# Patient Record
Sex: FEMALE | Race: WHITE | URBAN - METROPOLITAN AREA
[De-identification: names, ages, dates, MRNs, and addresses within clinical notes are randomized per-mention and may not be internally consistent; named-entity substitution may affect disease eponyms.]

---

## 2018-02-01 ENCOUNTER — EMERGENCY (EMERGENCY)
Facility: HOSPITAL | Age: 43
LOS: 0 days | Discharge: HOME | End: 2018-02-01

## 2018-02-01 DIAGNOSIS — L02.411 CUTANEOUS ABSCESS OF RIGHT AXILLA: ICD-10-CM

## 2018-02-01 DIAGNOSIS — D25.9 LEIOMYOMA OF UTERUS, UNSPECIFIED: ICD-10-CM

## 2020-06-11 ENCOUNTER — EMERGENCY (EMERGENCY)
Facility: HOSPITAL | Age: 45
LOS: 0 days | Discharge: HOME | End: 2020-06-11
Attending: EMERGENCY MEDICINE | Admitting: EMERGENCY MEDICINE
Payer: COMMERCIAL

## 2020-06-11 VITALS
RESPIRATION RATE: 18 BRPM | OXYGEN SATURATION: 100 % | SYSTOLIC BLOOD PRESSURE: 148 MMHG | DIASTOLIC BLOOD PRESSURE: 90 MMHG | WEIGHT: 156.97 LBS | TEMPERATURE: 98 F | HEART RATE: 73 BPM

## 2020-06-11 DIAGNOSIS — L02.213 CUTANEOUS ABSCESS OF CHEST WALL: ICD-10-CM

## 2020-06-11 DIAGNOSIS — L03.313 CELLULITIS OF CHEST WALL: ICD-10-CM

## 2020-06-11 DIAGNOSIS — L02.91 CUTANEOUS ABSCESS, UNSPECIFIED: ICD-10-CM

## 2020-06-11 PROCEDURE — 99283 EMERGENCY DEPT VISIT LOW MDM: CPT

## 2020-06-11 NOTE — ED ADULT NURSE NOTE - NSIMPLEMENTINTERV_GEN_ALL_ED
Implemented All Universal Safety Interventions:  [place] to call system. Call bell, personal items and telephone within reach. Instruct patient to call for assistance. Room bathroom lighting operational. Non-slip footwear when patient is off stretcher. Physically safe environment: no spills, clutter or unnecessary equipment. Stretcher in lowest position, wheels locked, appropriate side rails in place.

## 2020-06-11 NOTE — ED PROVIDER NOTE - CLINICAL SUMMARY MEDICAL DECISION MAKING FREE TEXT BOX
pt with cutaneous abscess to left chest wall. mild surrounding cellulitis. I and D, abx, dc home wc in 48 hours.

## 2020-06-11 NOTE — ED PROVIDER NOTE - PATIENT PORTAL LINK FT
You can access the FollowMyHealth Patient Portal offered by [facility] by registering at the following website: [email or web address]. By joining [facility]’s FollowMyHealth portal, you will also be able to view your health information using other applications (apps) compatible with our system.

## 2020-06-11 NOTE — ED PROVIDER NOTE - NS ED ROS FT
Constitutional: See HPI.  Eyes: No visual changes, eye pain or discharge. No Photophobia  ENMT: No hearing changes, pain, discharge or infections. No neck pain or stiffness. No limited ROM  GI: No nausea, vomiting, diarrhea or abdominal pain.  MS: No myalgia, muscle weakness, joint pain or back pain.  Skin: see hpi   Except as documented in the HPI, all other systems are negative.

## 2020-06-11 NOTE — ED PROVIDER NOTE - OBJECTIVE STATEMENT
44 y/o F hx of skin abscesses p/w left upper quadrant abdominal wall abscess over past 4 days, tender to touch, no fevers/chills, no nausea/vomiting/diarrhea. no other complaints, unknown hx of MRSA.

## 2020-06-11 NOTE — ED ADULT NURSE NOTE - BOWEL SOUNDS LLQ
Start 100 mcg synthroid- samples sent / provided  F/u 6-8 weeks with follow up tfts and thyroid antibodies  Reviewed outside lab work - normal T4 with high TSH consistent with subclinical hypothyroidism   Management discussed along with goals for treatment in early pregnancy    present

## 2020-06-11 NOTE — ED PROVIDER NOTE - PHYSICAL EXAMINATION
VITAL SIGNS: I have reviewed nursing notes and confirm.  CONSTITUTIONAL: well-appearing, non-toxic, NAD  SKIN: Warm dry, normal skin turgor, left upper abdominal wall fluctuance, tenderness, with surrounding erythema  HEAD: NCAT  NECK: Supple; non tender. Full ROM. No cervical LAD  CARD: RRR, no murmurs, rubs or gallops  RESP: clear to ausculation b/l.  No rales, rhonchi, or wheezing.  ABD: soft, + BS, non-tender, non-distended, no rebound or guarding. No CVA tenderness  PSYCH: Cooperative, appropriate.

## 2020-06-11 NOTE — ED PROVIDER NOTE - NSFOLLOWUPINSTRUCTIONS_ED_ALL_ED_FT
Abscess    An abscess is an infected area that contains a collection of pus and debris. It can occur in almost any part of the body and occurs when the tissue gets infection. Symptoms include a painful mass that is red, warm, tender that might break open and HAVE drainage. If your health care provider gave you antibiotics make sure to take the full course and do not stop even if feeling better.   Please provide warm compresses to the affected area multiple times a day. you can do that by soaking a towel under warm water or using heat pack.   SEEK IMMEDIATE MEDICAL CARE IF YOU HAVE ANY OF THE FOLLOWING SYMPTOMS: chills, fever, muscle aches, or red streaking from the area or if symptoms arent improving or getting worse

## 2022-02-24 ENCOUNTER — APPOINTMENT (OUTPATIENT)
Dept: PLASTIC SURGERY | Facility: CLINIC | Age: 47
End: 2022-02-24

## 2023-04-03 NOTE — ED ADULT NURSE NOTE - NS ED NURSE LEVEL OF CONSCIOUSNESS AFFECT
Pt c/o abdominal cramping and diarrhea since colonoscopy on 4/23. Pt denies other complaints.  
Calm

## 2023-10-04 ENCOUNTER — INPATIENT (INPATIENT)
Facility: HOSPITAL | Age: 48
LOS: 5 days | Discharge: HOME | DRG: 809 | End: 2023-10-10
Attending: STUDENT IN AN ORGANIZED HEALTH CARE EDUCATION/TRAINING PROGRAM | Admitting: STUDENT IN AN ORGANIZED HEALTH CARE EDUCATION/TRAINING PROGRAM
Payer: COMMERCIAL

## 2023-10-04 ENCOUNTER — TRANSCRIPTION ENCOUNTER (OUTPATIENT)
Age: 48
End: 2023-10-04

## 2023-10-04 VITALS
SYSTOLIC BLOOD PRESSURE: 104 MMHG | WEIGHT: 165.35 LBS | TEMPERATURE: 103 F | RESPIRATION RATE: 18 BRPM | DIASTOLIC BLOOD PRESSURE: 62 MMHG | OXYGEN SATURATION: 98 % | HEART RATE: 108 BPM

## 2023-10-04 DIAGNOSIS — Z90.710 ACQUIRED ABSENCE OF BOTH CERVIX AND UTERUS: Chronic | ICD-10-CM

## 2023-10-04 DIAGNOSIS — D70.9 NEUTROPENIA, UNSPECIFIED: ICD-10-CM

## 2023-10-04 LAB
ALBUMIN SERPL ELPH-MCNC: 4.3 G/DL — SIGNIFICANT CHANGE UP (ref 3.5–5.2)
ALP SERPL-CCNC: 72 U/L — SIGNIFICANT CHANGE UP (ref 30–115)
ALT FLD-CCNC: 23 U/L — SIGNIFICANT CHANGE UP (ref 0–41)
ANION GAP SERPL CALC-SCNC: 10 MMOL/L — SIGNIFICANT CHANGE UP (ref 7–14)
ANISOCYTOSIS BLD QL: SLIGHT — SIGNIFICANT CHANGE UP
APPEARANCE UR: CLEAR — SIGNIFICANT CHANGE UP
APTT BLD: 31.3 SEC — SIGNIFICANT CHANGE UP (ref 27–39.2)
AST SERPL-CCNC: 19 U/L — SIGNIFICANT CHANGE UP (ref 0–41)
BACTERIA # UR AUTO: ABNORMAL /HPF
BASE EXCESS BLDV CALC-SCNC: 0.7 MMOL/L — SIGNIFICANT CHANGE UP (ref -2–3)
BASOPHILS # BLD AUTO: 0.05 K/UL — SIGNIFICANT CHANGE UP (ref 0–0.2)
BASOPHILS NFR BLD AUTO: 4.8 % — HIGH (ref 0–1)
BILIRUB SERPL-MCNC: 0.4 MG/DL — SIGNIFICANT CHANGE UP (ref 0.2–1.2)
BILIRUB UR-MCNC: NEGATIVE — SIGNIFICANT CHANGE UP
BUN SERPL-MCNC: 7 MG/DL — LOW (ref 10–20)
CA-I SERPL-SCNC: 1.23 MMOL/L — SIGNIFICANT CHANGE UP (ref 1.15–1.33)
CALCIUM SERPL-MCNC: 9.7 MG/DL — SIGNIFICANT CHANGE UP (ref 8.4–10.5)
CHLORIDE SERPL-SCNC: 97 MMOL/L — LOW (ref 98–110)
CO2 SERPL-SCNC: 25 MMOL/L — SIGNIFICANT CHANGE UP (ref 17–32)
COLOR SPEC: YELLOW — SIGNIFICANT CHANGE UP
CREAT SERPL-MCNC: 0.8 MG/DL — SIGNIFICANT CHANGE UP (ref 0.7–1.5)
DACRYOCYTES BLD QL SMEAR: SLIGHT — SIGNIFICANT CHANGE UP
DIFF PNL FLD: ABNORMAL
EGFR: 91 ML/MIN/1.73M2 — SIGNIFICANT CHANGE UP
EOSINOPHIL # BLD AUTO: 0 K/UL — SIGNIFICANT CHANGE UP (ref 0–0.7)
EOSINOPHIL NFR BLD AUTO: 0 % — SIGNIFICANT CHANGE UP (ref 0–8)
EPI CELLS # UR: PRESENT
FLUAV AG NPH QL: SIGNIFICANT CHANGE UP
FLUBV AG NPH QL: SIGNIFICANT CHANGE UP
GAS PNL BLDV: 131 MMOL/L — LOW (ref 136–145)
GAS PNL BLDV: SIGNIFICANT CHANGE UP
GAS PNL BLDV: SIGNIFICANT CHANGE UP
GIANT PLATELETS BLD QL SMEAR: PRESENT — SIGNIFICANT CHANGE UP
GLUCOSE SERPL-MCNC: 135 MG/DL — HIGH (ref 70–99)
GLUCOSE UR QL: NEGATIVE MG/DL — SIGNIFICANT CHANGE UP
HCO3 BLDV-SCNC: 26 MMOL/L — SIGNIFICANT CHANGE UP (ref 22–29)
HCT VFR BLD CALC: 30.2 % — LOW (ref 37–47)
HCT VFR BLDA CALC: 53 % — HIGH (ref 39–51)
HGB BLD CALC-MCNC: 17.6 G/DL — HIGH (ref 12.6–17.4)
HGB BLD-MCNC: 10.3 G/DL — LOW (ref 12–16)
INR BLD: 1.1 RATIO — SIGNIFICANT CHANGE UP (ref 0.65–1.3)
KETONES UR-MCNC: NEGATIVE MG/DL — SIGNIFICANT CHANGE UP
LACTATE BLDV-MCNC: 1.8 MMOL/L — SIGNIFICANT CHANGE UP (ref 0.5–2)
LEUKOCYTE ESTERASE UR-ACNC: NEGATIVE — SIGNIFICANT CHANGE UP
LYMPHOCYTES # BLD AUTO: 0.93 K/UL — LOW (ref 1.2–3.4)
LYMPHOCYTES # BLD AUTO: 84.6 % — HIGH (ref 20.5–51.1)
MACROCYTES BLD QL: SLIGHT — SIGNIFICANT CHANGE UP
MANUAL SMEAR VERIFICATION: SIGNIFICANT CHANGE UP
MCHC RBC-ENTMCNC: 34.1 G/DL — SIGNIFICANT CHANGE UP (ref 32–37)
MCHC RBC-ENTMCNC: 35.2 PG — HIGH (ref 27–31)
MCV RBC AUTO: 103.1 FL — HIGH (ref 81–99)
MONOCYTES # BLD AUTO: 0.04 K/UL — LOW (ref 0.1–0.6)
MONOCYTES NFR BLD AUTO: 3.9 % — SIGNIFICANT CHANGE UP (ref 1.7–9.3)
NEUTROPHILS # BLD AUTO: 0.02 K/UL — LOW (ref 1.4–6.5)
NEUTROPHILS NFR BLD AUTO: 1.9 % — LOW (ref 42.2–75.2)
NITRITE UR-MCNC: NEGATIVE — SIGNIFICANT CHANGE UP
NRBC # BLD: 1 /100 — HIGH (ref 0–0)
NRBC # BLD: SIGNIFICANT CHANGE UP /100 WBCS (ref 0–0)
PCO2 BLDV: 43 MMHG — HIGH (ref 39–42)
PH BLDV: 7.39 — SIGNIFICANT CHANGE UP (ref 7.32–7.43)
PH UR: 7.5 — SIGNIFICANT CHANGE UP (ref 5–8)
PLAT MORPH BLD: ABNORMAL
PLATELET # BLD AUTO: 37 K/UL — LOW (ref 130–400)
PMV BLD: 12.6 FL — HIGH (ref 7.4–10.4)
PO2 BLDV: 43 MMHG — SIGNIFICANT CHANGE UP
POIKILOCYTOSIS BLD QL AUTO: SLIGHT — SIGNIFICANT CHANGE UP
POTASSIUM BLDV-SCNC: 3.9 MMOL/L — SIGNIFICANT CHANGE UP (ref 3.5–5.1)
POTASSIUM SERPL-MCNC: 4.4 MMOL/L — SIGNIFICANT CHANGE UP (ref 3.5–5)
POTASSIUM SERPL-SCNC: 4.4 MMOL/L — SIGNIFICANT CHANGE UP (ref 3.5–5)
PROT SERPL-MCNC: 6.5 G/DL — SIGNIFICANT CHANGE UP (ref 6–8)
PROT UR-MCNC: 30 MG/DL
PROTHROM AB SERPL-ACNC: 12.6 SEC — SIGNIFICANT CHANGE UP (ref 9.95–12.87)
RBC # BLD: 2.93 M/UL — LOW (ref 4.2–5.4)
RBC # FLD: 12.6 % — SIGNIFICANT CHANGE UP (ref 11.5–14.5)
RBC BLD AUTO: ABNORMAL
RBC CASTS # UR COMP ASSIST: SIGNIFICANT CHANGE UP /HPF (ref 0–4)
RSV RNA NPH QL NAA+NON-PROBE: SIGNIFICANT CHANGE UP
SAO2 % BLDV: 68.2 % — SIGNIFICANT CHANGE UP
SARS-COV-2 RNA SPEC QL NAA+PROBE: SIGNIFICANT CHANGE UP
SODIUM SERPL-SCNC: 132 MMOL/L — LOW (ref 135–146)
SP GR SPEC: 1.01 — SIGNIFICANT CHANGE UP (ref 1–1.03)
STOMATOCYTES BLD QL SMEAR: SLIGHT — SIGNIFICANT CHANGE UP
UROBILINOGEN FLD QL: 1 MG/DL — SIGNIFICANT CHANGE UP (ref 0.2–1)
VARIANT LYMPHS # BLD: 4.8 % — SIGNIFICANT CHANGE UP (ref 0–5)
WBC # BLD: 1.1 K/UL — LOW (ref 4.8–10.8)
WBC # FLD AUTO: 1.1 K/UL — LOW (ref 4.8–10.8)
WBC UR QL: 0 /HPF — SIGNIFICANT CHANGE UP (ref 0–5)

## 2023-10-04 PROCEDURE — 99291 CRITICAL CARE FIRST HOUR: CPT

## 2023-10-04 PROCEDURE — 86900 BLOOD TYPING SEROLOGIC ABO: CPT

## 2023-10-04 PROCEDURE — 80053 COMPREHEN METABOLIC PANEL: CPT

## 2023-10-04 PROCEDURE — 82746 ASSAY OF FOLIC ACID SERUM: CPT

## 2023-10-04 PROCEDURE — 85379 FIBRIN DEGRADATION QUANT: CPT

## 2023-10-04 PROCEDURE — 85610 PROTHROMBIN TIME: CPT

## 2023-10-04 PROCEDURE — 86901 BLOOD TYPING SEROLOGIC RH(D): CPT

## 2023-10-04 PROCEDURE — 83605 ASSAY OF LACTIC ACID: CPT

## 2023-10-04 PROCEDURE — 87040 BLOOD CULTURE FOR BACTERIA: CPT

## 2023-10-04 PROCEDURE — 36415 COLL VENOUS BLD VENIPUNCTURE: CPT

## 2023-10-04 PROCEDURE — 74176 CT ABD & PELVIS W/O CONTRAST: CPT

## 2023-10-04 PROCEDURE — 85027 COMPLETE CBC AUTOMATED: CPT

## 2023-10-04 PROCEDURE — 0225U NFCT DS DNA&RNA 21 SARSCOV2: CPT

## 2023-10-04 PROCEDURE — 93010 ELECTROCARDIOGRAM REPORT: CPT

## 2023-10-04 PROCEDURE — 83735 ASSAY OF MAGNESIUM: CPT

## 2023-10-04 PROCEDURE — 85730 THROMBOPLASTIN TIME PARTIAL: CPT

## 2023-10-04 PROCEDURE — 86850 RBC ANTIBODY SCREEN: CPT

## 2023-10-04 PROCEDURE — 87449 NOS EACH ORGANISM AG IA: CPT

## 2023-10-04 PROCEDURE — 93005 ELECTROCARDIOGRAM TRACING: CPT

## 2023-10-04 PROCEDURE — 71045 X-RAY EXAM CHEST 1 VIEW: CPT | Mod: 26

## 2023-10-04 PROCEDURE — 82607 VITAMIN B-12: CPT

## 2023-10-04 PROCEDURE — 84145 PROCALCITONIN (PCT): CPT

## 2023-10-04 PROCEDURE — 80202 ASSAY OF VANCOMYCIN: CPT

## 2023-10-04 PROCEDURE — 85025 COMPLETE CBC W/AUTO DIFF WBC: CPT

## 2023-10-04 RX ORDER — CEFEPIME 1 G/1
2000 INJECTION, POWDER, FOR SOLUTION INTRAMUSCULAR; INTRAVENOUS ONCE
Refills: 0 | Status: COMPLETED | OUTPATIENT
Start: 2023-10-04 | End: 2023-10-04

## 2023-10-04 RX ORDER — ACETAMINOPHEN 500 MG
650 TABLET ORAL ONCE
Refills: 0 | Status: COMPLETED | OUTPATIENT
Start: 2023-10-04 | End: 2023-10-04

## 2023-10-04 RX ORDER — SODIUM CHLORIDE 9 MG/ML
2300 INJECTION, SOLUTION INTRAVENOUS ONCE
Refills: 0 | Status: COMPLETED | OUTPATIENT
Start: 2023-10-04 | End: 2023-10-04

## 2023-10-04 RX ORDER — CEFEPIME 1 G/1
2000 INJECTION, POWDER, FOR SOLUTION INTRAMUSCULAR; INTRAVENOUS EVERY 8 HOURS
Refills: 0 | Status: DISCONTINUED | OUTPATIENT
Start: 2023-10-05 | End: 2023-10-05

## 2023-10-04 RX ORDER — CEFEPIME 1 G/1
INJECTION, POWDER, FOR SOLUTION INTRAMUSCULAR; INTRAVENOUS
Refills: 0 | Status: DISCONTINUED | OUTPATIENT
Start: 2023-10-04 | End: 2023-10-05

## 2023-10-04 RX ORDER — ONDANSETRON 8 MG/1
4 TABLET, FILM COATED ORAL ONCE
Refills: 0 | Status: COMPLETED | OUTPATIENT
Start: 2023-10-04 | End: 2023-10-04

## 2023-10-04 RX ORDER — VANCOMYCIN HCL 1 G
1000 VIAL (EA) INTRAVENOUS ONCE
Refills: 0 | Status: COMPLETED | OUTPATIENT
Start: 2023-10-04 | End: 2023-10-04

## 2023-10-04 RX ADMIN — CEFEPIME 100 MILLIGRAM(S): 1 INJECTION, POWDER, FOR SOLUTION INTRAMUSCULAR; INTRAVENOUS at 21:16

## 2023-10-04 RX ADMIN — SODIUM CHLORIDE 1000 MILLILITER(S): 9 INJECTION, SOLUTION INTRAVENOUS at 22:30

## 2023-10-04 RX ADMIN — Medication 250 MILLIGRAM(S): at 21:34

## 2023-10-04 RX ADMIN — Medication 650 MILLIGRAM(S): at 19:30

## 2023-10-04 RX ADMIN — SODIUM CHLORIDE 2300 MILLILITER(S): 9 INJECTION, SOLUTION INTRAVENOUS at 19:37

## 2023-10-04 RX ADMIN — ONDANSETRON 4 MILLIGRAM(S): 8 TABLET, FILM COATED ORAL at 19:31

## 2023-10-04 NOTE — ED ADULT NURSE REASSESSMENT NOTE - NS ED NURSE REASSESS COMMENT FT1
received pt from previous shift, pt resting in bed, Tylenol and fluids given as ordered. all needs attended at this time.

## 2023-10-04 NOTE — ED PROVIDER NOTE - WR ORDER DATE AND TIME 1
04-Oct-2023 19:14
FAMILY HISTORY:  Father  Still living? Unknown  Family history of colon cancer in father, Age at diagnosis: Age Unknown

## 2023-10-04 NOTE — ED ADULT TRIAGE NOTE - CHIEF COMPLAINT QUOTE
Patient in NAD a+ox3 hx of breast CA on chemo co fever today, tmax 103F denies taking antipyretics. Pt co nausea and body aches

## 2023-10-04 NOTE — ED ADULT NURSE REASSESSMENT NOTE - NS ED NURSE REASSESS COMMENT FT1
around 2230 PT  manual bp 80/50  pt a&Ox4 not in any distress, pt reports feeling better than when she came in, denies dizziness or lightheadedness at this time. MD Rodriges notified 1 L LR bolus ordered. and given w/ + effect noted. b/p 104/63 hr 96 oral temp 99.3 all needs attended at this time, pt attempting to get some rest.

## 2023-10-04 NOTE — ED PROVIDER NOTE - ATTENDING CONTRIBUTION TO CARE
Attending Statement: I have personally provided the amount of critical care time documented below excluding time spent on separate procedures.     Critical Care Time Spent (min) Must be 30 or more minutes to qualify: 35.

## 2023-10-04 NOTE — ED PROVIDER NOTE - PHYSICAL EXAMINATION
CONSTITUTIONAL: Well-developed; well-nourished; in no acute distress.   SKIN: Warm, diaphoretic.  HEAD: Normocephalic; atraumatic  EYES: EOMI, normal sclera and conjunctiva   ENT: No nasal discharge; airway clear. MMM  NECK: Supple; non tender.  CARD:  Regular rate and rhythm. Normal S1, S2. 2+ radial pulses. Normal capillary refill.  RESP: No increased WOB. CTA b/l without wheezes, crackles, rhonchi  ABD: Normoactive BS. Soft, nontender, nondistended.  EXT: Normal ROM. No LE edema.  NEURO: Alert, oriented, grossly unremarkable  PSYCH: Cooperative, appropriate.

## 2023-10-04 NOTE — ED PROVIDER NOTE - EKG ADDITIONAL INFORMATION FREE TEXT
Sinus tachycardia, normal axis, nonspecific T wave abnormalities, no ST depression or elevation, QTc 539

## 2023-10-04 NOTE — ED PROVIDER NOTE - CLINICAL SUMMARY MEDICAL DECISION MAKING FREE TEXT BOX
48-year-old female with history of breast cancer, on chemotherapy, last session 1 week ago presents with 1 day of fever and 1 week of generalized fatigue.  No other symptoms.  On exam fatigued appearing, nontoxic, lungs clear bilaterally, heart regular no murmurs, no rash, right chest port with no erythema or swelling, abdomen benign, no CVA tenderness.  No meningismus.  Patient noted to be hypotensive, given 31 cc/kg, found to be neutropenic, given broad-spectrum IV antibiotics, lactate normal, blood cultures sent prior to antibiotics, chest x-ray clear my interpretation urinalysis not consistent with infection.  Patient with improved blood pressure status post fluids, admitted to stepdown unit.

## 2023-10-05 PROBLEM — Z00.00 ENCOUNTER FOR PREVENTIVE HEALTH EXAMINATION: Status: ACTIVE | Noted: 2023-10-05

## 2023-10-05 LAB
ALBUMIN SERPL ELPH-MCNC: 3.4 G/DL — LOW (ref 3.5–5.2)
ALP SERPL-CCNC: 58 U/L — SIGNIFICANT CHANGE UP (ref 30–115)
ALT FLD-CCNC: 19 U/L — SIGNIFICANT CHANGE UP (ref 0–41)
ANION GAP SERPL CALC-SCNC: 9 MMOL/L — SIGNIFICANT CHANGE UP (ref 7–14)
AST SERPL-CCNC: 19 U/L — SIGNIFICANT CHANGE UP (ref 0–41)
BASOPHILS # BLD AUTO: 0.02 K/UL — SIGNIFICANT CHANGE UP (ref 0–0.2)
BASOPHILS NFR BLD AUTO: 3.2 % — HIGH (ref 0–1)
BILIRUB SERPL-MCNC: 0.4 MG/DL — SIGNIFICANT CHANGE UP (ref 0.2–1.2)
BUN SERPL-MCNC: 6 MG/DL — LOW (ref 10–20)
CALCIUM SERPL-MCNC: 8.4 MG/DL — SIGNIFICANT CHANGE UP (ref 8.4–10.5)
CHLORIDE SERPL-SCNC: 104 MMOL/L — SIGNIFICANT CHANGE UP (ref 98–110)
CO2 SERPL-SCNC: 25 MMOL/L — SIGNIFICANT CHANGE UP (ref 17–32)
CREAT SERPL-MCNC: 0.6 MG/DL — LOW (ref 0.7–1.5)
EGFR: 111 ML/MIN/1.73M2 — SIGNIFICANT CHANGE UP
EOSINOPHIL # BLD AUTO: 0 K/UL — SIGNIFICANT CHANGE UP (ref 0–0.7)
EOSINOPHIL NFR BLD AUTO: 0 % — SIGNIFICANT CHANGE UP (ref 0–8)
FOLATE SERPL-MCNC: >20 NG/ML — SIGNIFICANT CHANGE UP
GLUCOSE SERPL-MCNC: 114 MG/DL — HIGH (ref 70–99)
HCT VFR BLD CALC: 24.8 % — LOW (ref 37–47)
HGB BLD-MCNC: 8.6 G/DL — LOW (ref 12–16)
IMM GRANULOCYTES NFR BLD AUTO: 3.2 % — HIGH (ref 0.1–0.3)
LACTATE SERPL-SCNC: 1.1 MMOL/L — SIGNIFICANT CHANGE UP (ref 0.7–2)
LYMPHOCYTES # BLD AUTO: 0.54 K/UL — LOW (ref 1.2–3.4)
LYMPHOCYTES # BLD AUTO: 87.1 % — HIGH (ref 20.5–51.1)
MAGNESIUM SERPL-MCNC: 1.5 MG/DL — LOW (ref 1.8–2.4)
MCHC RBC-ENTMCNC: 34.7 G/DL — SIGNIFICANT CHANGE UP (ref 32–37)
MCHC RBC-ENTMCNC: 36.1 PG — HIGH (ref 27–31)
MCV RBC AUTO: 104.2 FL — HIGH (ref 81–99)
MONOCYTES # BLD AUTO: 0.03 K/UL — LOW (ref 0.1–0.6)
MONOCYTES NFR BLD AUTO: 4.8 % — SIGNIFICANT CHANGE UP (ref 1.7–9.3)
NEUTROPHILS # BLD AUTO: 0.01 K/UL — LOW (ref 1.4–6.5)
NEUTROPHILS NFR BLD AUTO: 1.7 % — LOW (ref 42.2–75.2)
NRBC # BLD: 0 /100 WBCS — SIGNIFICANT CHANGE UP (ref 0–0)
PLATELET # BLD AUTO: 31 K/UL — LOW (ref 130–400)
PMV BLD: 12.5 FL — HIGH (ref 7.4–10.4)
POTASSIUM SERPL-MCNC: 3.6 MMOL/L — SIGNIFICANT CHANGE UP (ref 3.5–5)
POTASSIUM SERPL-SCNC: 3.6 MMOL/L — SIGNIFICANT CHANGE UP (ref 3.5–5)
PROT SERPL-MCNC: 5.2 G/DL — LOW (ref 6–8)
RBC # BLD: 2.38 M/UL — LOW (ref 4.2–5.4)
RBC # FLD: 13 % — SIGNIFICANT CHANGE UP (ref 11.5–14.5)
SODIUM SERPL-SCNC: 138 MMOL/L — SIGNIFICANT CHANGE UP (ref 135–146)
VIT B12 SERPL-MCNC: 1848 PG/ML — HIGH (ref 232–1245)
WBC # BLD: 0.62 K/UL — CRITICAL LOW (ref 4.8–10.8)
WBC # FLD AUTO: 0.62 K/UL — CRITICAL LOW (ref 4.8–10.8)

## 2023-10-05 PROCEDURE — 99223 1ST HOSP IP/OBS HIGH 75: CPT

## 2023-10-05 PROCEDURE — 99254 IP/OBS CNSLTJ NEW/EST MOD 60: CPT

## 2023-10-05 RX ORDER — VANCOMYCIN HCL 1 G
1000 VIAL (EA) INTRAVENOUS EVERY 12 HOURS
Refills: 0 | Status: DISCONTINUED | OUTPATIENT
Start: 2023-10-05 | End: 2023-10-08

## 2023-10-05 RX ORDER — CEFEPIME 1 G/1
2000 INJECTION, POWDER, FOR SOLUTION INTRAMUSCULAR; INTRAVENOUS EVERY 8 HOURS
Refills: 0 | Status: DISCONTINUED | OUTPATIENT
Start: 2023-10-05 | End: 2023-10-07

## 2023-10-05 RX ORDER — SODIUM CHLORIDE 9 MG/ML
1000 INJECTION, SOLUTION INTRAVENOUS
Refills: 0 | Status: DISCONTINUED | OUTPATIENT
Start: 2023-10-05 | End: 2023-10-10

## 2023-10-05 RX ORDER — ACETAMINOPHEN 500 MG
650 TABLET ORAL EVERY 6 HOURS
Refills: 0 | Status: DISCONTINUED | OUTPATIENT
Start: 2023-10-05 | End: 2023-10-10

## 2023-10-05 RX ORDER — SODIUM CHLORIDE 9 MG/ML
1000 INJECTION, SOLUTION INTRAVENOUS ONCE
Refills: 0 | Status: DISCONTINUED | OUTPATIENT
Start: 2023-10-05 | End: 2023-10-05

## 2023-10-05 RX ORDER — SODIUM CHLORIDE 9 MG/ML
1000 INJECTION INTRAMUSCULAR; INTRAVENOUS; SUBCUTANEOUS
Refills: 0 | Status: DISCONTINUED | OUTPATIENT
Start: 2023-10-05 | End: 2023-10-05

## 2023-10-05 RX ORDER — MAGNESIUM SULFATE 500 MG/ML
2 VIAL (ML) INJECTION ONCE
Refills: 0 | Status: COMPLETED | OUTPATIENT
Start: 2023-10-05 | End: 2023-10-05

## 2023-10-05 RX ADMIN — CEFEPIME 100 MILLIGRAM(S): 1 INJECTION, POWDER, FOR SOLUTION INTRAMUSCULAR; INTRAVENOUS at 21:03

## 2023-10-05 RX ADMIN — SODIUM CHLORIDE 70 MILLILITER(S): 9 INJECTION INTRAMUSCULAR; INTRAVENOUS; SUBCUTANEOUS at 02:25

## 2023-10-05 RX ADMIN — SODIUM CHLORIDE 150 MILLILITER(S): 9 INJECTION, SOLUTION INTRAVENOUS at 12:45

## 2023-10-05 RX ADMIN — Medication 650 MILLIGRAM(S): at 02:24

## 2023-10-05 RX ADMIN — CEFEPIME 100 MILLIGRAM(S): 1 INJECTION, POWDER, FOR SOLUTION INTRAMUSCULAR; INTRAVENOUS at 12:31

## 2023-10-05 RX ADMIN — CEFEPIME 100 MILLIGRAM(S): 1 INJECTION, POWDER, FOR SOLUTION INTRAMUSCULAR; INTRAVENOUS at 06:16

## 2023-10-05 RX ADMIN — Medication 250 MILLIGRAM(S): at 17:36

## 2023-10-05 RX ADMIN — Medication 25 GRAM(S): at 09:09

## 2023-10-05 NOTE — CONSULT NOTE ADULT - ASSESSMENT
IMPRESSION:    Sepsis POA   neutropenic sepsis   HO Breast Ca on Chemotherapy     PLAN:    CNS:  No depressants.      HEENT: Oral care    PULMONARY:  HOB @ 45 degrees.  Aspiration precautions.  Incentive spirometry.  CXR noted.     CARDIOVASCULAR:  Goal directed fluid resuscitation.  NS bolus.  Gentle hydration.  ECHo.     GI: GI prophylaxis.  Feeding.  Bowel regimen     RENAL:  Follow up lytes.  Correct as needed.      INFECTIOUS DISEASE: Follow up cultures.  Procal.  RVP negative.  Cefepime and Vanc for now.      HEMATOLOGICAL:  DVT prophylaxis.  Monitor CBC.  Dimer     ENDOCRINE:  Follow up FS.  Insulin protocol if needed.    MUSCULOSKELETAL:  OOB to chair.  PT OT     DGTF

## 2023-10-05 NOTE — H&P ADULT - NSHPREVIEWOFSYSTEMS_GEN_ALL_CORE
REVIEW OF SYSTEMS:    CONSTITUTIONAL: Fever chills  EYES/ENT: No visual changes;  No vertigo or throat pain   NECK: No pain or stiffness  RESPIRATORY: Runny nose. No cough, wheezing, hemoptysis; No shortness of breath  CARDIOVASCULAR: No chest pain or palpitations  GASTROINTESTINAL: No abdominal or epigastric pain. No nausea, vomiting, or hematemesis; No diarrhea or constipation. No melena or hematochezia.  GENITOURINARY: No dysuria, frequency or hematuria  NEUROLOGICAL: Mild headache (similar to previous headaches). No numbness or weakness  SKIN: No itching, rashes

## 2023-10-05 NOTE — H&P ADULT - HISTORY OF PRESENT ILLNESS
49 y/o F with PMH stage III breast CA (following with Dr. Adams at [facility]) chemo, finished last session on 9/27, HTN, hx hysterectomy presenting for body aches, chills, fatigue, runny nose since last chemo, worsened last night, with fever since this morning. Pt complained of feeling faint while waiting to be triaged that started while in waiting room. Pt reports she has constant nausea and vomiting 2/2 chemo but last vomited 3 days ago and has not used antiemetic since then. Denies chest pain, abd pain, cough, SOB, dysuria, diarrhea, back pain, headache, neck pain.    Septic on admission  febrile, tachycardia, low wbc (neutropenia)  UA negative, RVP negative. BCx and UCx pending.  s/p 1 dose of vancomycin in the ED, and was started on cefepime  Chest xray pending     49 y/o F with PMH migraine, stage III breast CA (following with Dr. Adams at [facility]) chemo, finished last session on 9/27, hx hysterectomy presenting for body aches, chills, fatigue, runny nose since last chemo, worsened last night, with fever since this morning. Pt complained of feeling faint while waiting to be triaged that started while in waiting room. Pt reports she has constant nausea and vomiting 2/2 chemo but last vomited 3 days ago and has not used antiemetic since then. Denies chest pain, abd pain, cough, SOB, dysuria, diarrhea, back pain, headache, neck pain.    Septic on admission  febrile, tachycardia, low wbc (neutropenia)  UA negative, RVP negative. BCx and UCx pending.  s/p 1 dose of vancomycin in the ED, and was started on cefepime  Chest xray pending

## 2023-10-05 NOTE — H&P ADULT - ASSESSMENT
47 y/o F with PMH stage III breast CA on chemo, finished last session on 9/27, HTN, hx hysterectomy presenting for body aches, chills, fatigue, runny nose. Found to have neutropenic fever.    #Neutropenic fever  #Breast Ca stage 3, on chemotherapy (following with Dr. Adams at [facility])  -last chemo on 9/27/2023  -febrile on admission, tachy, wbc 1100, neutrophils 20   -s/p vancomycin  -              #Macrocytic anemia  -likely secondary to chemotherapy  -check b12 and folate     47 y/o F with PMH stage III breast CA on chemo, finished last session on 9/27, hx hysterectomy presenting for body aches, chills, fatigue, runny nose. Found to have neutropenic fever.    #Neutropenic fever  #Breast Ca stage 3, on chemotherapy (following with Dr. Adams at [facility])  -last chemo on 9/27/2023, febrile since 10/4, tachy, wbc 1100, ANC 20   -s/p vancomycin  -low risk for pseudomonas, c/w cefepime  -if febrile for more than 4 days, consider starting antifungal therapy  -f/u fungitel  -f/u chest xray, BCx, UCx, procal  -f/u heme/onc cx, might need G-CSF  -daily labs   - will get [facility] records in AM    #Macrocytic anemia  -likely secondary to chemotherapy  -check b12 and folate    #hx of migraine   -stable  -on home propranolol (pt unsure of the dose)    MISC:  DVT ppx: scds  Diet: Regular  Activity: IAT  GI ppx: none

## 2023-10-05 NOTE — CONSULT NOTE ADULT - SUBJECTIVE AND OBJECTIVE BOX
Patient is a 48y old  Female who presents with a chief complaint of Fever (05 Oct 2023 00:36)      HPI:  49 y/o F with PMH migraine, stage III breast CA (following with Dr. Adams at [facility]) chemo, finished last session on , hx hysterectomy presenting for body aches, chills, fatigue, runny nose since last chemo, worsened last night, with fever since this morning. Pt complained of feeling faint while waiting to be triaged that started while in waiting room. Pt reports she has constant nausea and vomiting 2/2 chemo but last vomited 3 days ago and has not used antiemetic since then. Denies chest pain, abd pain, cough, SOB, dysuria, diarrhea, back pain, headache, neck pain.    Septic on admission  febrile, tachycardia, low wbc (neutropenia)  UA negative, RVP negative. BCx and UCx pending.  s/p 1 dose of vancomycin in the ED, and was started on cefepime  Chest xray pending     (05 Oct 2023 00:36)      PAST MEDICAL & SURGICAL HISTORY:  Breast cancer      Hypertension      S/P hysterectomy              FAMILY HISTORY:  :  No known cardiovacular family hisotry     Review Of Systems:     All ROS are negative except per HPI       Allergies    No Known Allergies    Intolerances          PHYSICAL EXAM    ICU Vital Signs Last 24 Hrs  T(C): 37.5 (05 Oct 2023 05:36), Max: 40 (05 Oct 2023 02:30)  T(F): 99.5 (05 Oct 2023 05:36), Max: 104 (05 Oct 2023 02:30)  HR: 104 (05 Oct 2023 05:36) (96 - 112)  BP: 84/53 (05 Oct 2023 05:36) (80/50 - 129/59)  BP(mean): 88 (04 Oct 2023 23:42) (72 - 88)  ABP: --  ABP(mean): --  RR: 18 (05 Oct 2023 05:36) (18 - 18)  SpO2: 99% (05 Oct 2023 05:36) (97% - 99%)    O2 Parameters below as of 05 Oct 2023 05:36  Patient On (Oxygen Delivery Method): room air            CONSTITUTIONAL:  NAD    ENT:   Airway patent,   Mouth with normal mucosa.     CARDIAC:   Normal rate,   Regular rhythm.    No edema    RESPIRATORY:   No wheezing  Bilateral BS   Not tachypneic,  No use of accessory muscles    GASTROINTESTINAL:  Abdomen soft,   Non-tender,   No guarding,   + BS      NEUROLOGICAL:   Alert and oriented   No motor deficits.                LABS:                          10.3   1.10  )-----------( 37       ( 04 Oct 2023 19:24 )             30.2                                               10-04    132<L>  |  97<L>  |  7<L>  ----------------------------<  135<H>  4.4   |  25  |  0.8    Ca    9.7      04 Oct 2023 19:24    TPro  6.5  /  Alb  4.3  /  TBili  0.4  /  DBili  x   /  AST  19  /  ALT  23  /  AlkPhos  72  10-04      PT/INR - ( 04 Oct 2023 19:24 )   PT: 12.60 sec;   INR: 1.10 ratio         PTT - ( 04 Oct 2023 19:24 )  PTT:31.3 sec                                       Urinalysis Basic - ( 04 Oct 2023 20:25 )    Color: Yellow / Appearance: Clear / S.009 / pH: x  Gluc: x / Ketone: Negative mg/dL  / Bili: Negative / Urobili: 1.0 mg/dL   Blood: x / Protein: 30 mg/dL / Nitrite: Negative   Leuk Esterase: Negative / RBC: 1-2 /HPF / WBC 0 /HPF   Sq Epi: x / Non Sq Epi: x / Bacteria: Few /HPF                                                  LIVER FUNCTIONS - ( 04 Oct 2023 19:24 )  Alb: 4.3 g/dL / Pro: 6.5 g/dL / ALK PHOS: 72 U/L / ALT: 23 U/L / AST: 19 U/L / GGT: x                                                                                                                                       X-Rays reviewed                                                                                     ECHO        MEDICATIONS  (STANDING):  cefepime   IVPB 2000 milliGRAM(s) IV Intermittent every 8 hours  sodium chloride 0.9%. 1000 milliLiter(s) (70 mL/Hr) IV Continuous <Continuous>    MEDICATIONS  (PRN):  acetaminophen     Tablet .. 650 milliGRAM(s) Oral every 6 hours PRN Temp greater or equal to 38C (100.4F), Mild Pain (1 - 3)

## 2023-10-05 NOTE — ED ADULT NURSE REASSESSMENT NOTE - NS ED NURSE REASSESS COMMENT FT1
Pt noted with oral temp of 104  b/p 111/69, aroound 0230 MD Werner notifed, ordered to give tylenol and place ice packs in 1 hour if no effeect will start cooling blanket.  @ 0330 temp 103.1 b/p 101/59 hr 110 per md orders pt started on cooling blanket at this time.  no other issues or complaints offered at this time. all needs attended.

## 2023-10-05 NOTE — ED ADULT NURSE REASSESSMENT NOTE - NS ED NURSE REASSESS COMMENT FT1
Pt. received from previous RN. Pt. lying on stretcher, breathing with ease on RA. A&O x4. On CCM. 20g noted to the R AC; IV intact, no redness/swelling at site. NS running at 70mL/hr via Alaris pump. Awaiting clean bed assignment.

## 2023-10-05 NOTE — H&P ADULT - NSHPPHYSICALEXAM_GEN_ALL_CORE
PHYSICAL EXAM:  GENERAL: NAD, speaks in full sentences, no signs of respiratory distress  PSYCH: AAOx3  NECK: Supple, No JVD  CHEST/LUNG: Clear to auscultation bilaterally; No wheeze; No crackles; No accessory muscles used  HEART: Tachycardiac; No murmurs;   ABDOMEN: Soft, Nontender, Nondistended; Bowel sounds present; No guarding  EXTREMITIES: No cyanosis or edema  NEUROLOGY: non-focal

## 2023-10-05 NOTE — H&P ADULT - NSHPLABSRESULTS_GEN_ALL_CORE
.  LABS:                         10.3   1.10  )-----------( 37       ( 04 Oct 2023 19:24 )             30.2     10-04    132<L>  |  97<L>  |  7<L>  ----------------------------<  135<H>  4.4   |  25  |  0.8    Ca    9.7      04 Oct 2023 19:24    TPro  6.5  /  Alb  4.3  /  TBili  0.4  /  DBili  x   /  AST  19  /  ALT  23  /  AlkPhos  72  10-04    PT/INR - ( 04 Oct 2023 19:24 )   PT: 12.60 sec;   INR: 1.10 ratio         PTT - ( 04 Oct 2023 19:24 )  PTT:31.3 sec  Urinalysis Basic - ( 04 Oct 2023 20:25 )    Color: Yellow / Appearance: Clear / S.009 / pH: x  Gluc: x / Ketone: Negative mg/dL  / Bili: Negative / Urobili: 1.0 mg/dL   Blood: x / Protein: 30 mg/dL / Nitrite: Negative   Leuk Esterase: Negative / RBC: 1-2 /HPF / WBC 0 /HPF   Sq Epi: x / Non Sq Epi: x / Bacteria: Few /HPF            RADIOLOGY, EKG & ADDITIONAL TESTS: Reviewed.

## 2023-10-05 NOTE — CONSULT NOTE ADULT - SUBJECTIVE AND OBJECTIVE BOX
Hematology Consult Note    HPI:  49 y/o F with PMH migraine, stage III breast CA (following with Dr. Adams at [facility]) chemo, finished last session on 9/27, hx hysterectomy presenting for body aches, chills, fatigue, runny nose since last chemo, worsened last night, with fever since this morning. Pt complained of feeling faint while waiting to be triaged that started while in waiting room. Pt reports she has constant nausea and vomiting 2/2 chemo but last vomited 3 days ago and has not used antiemetic since then. Denies chest pain, abd pain, cough, SOB, dysuria, diarrhea, back pain, headache, neck pain.    Septic on admission  febrile, tachycardia, low wbc (neutropenia)  UA negative, RVP negative. BCx and UCx pending.  s/p 1 dose of vancomycin in the ED, and was started on cefepime  Chest xray pending     (05 Oct 2023 00:36)      Allergies    No Known Allergies    Intolerances        MEDICATIONS  (STANDING):  cefepime   IVPB 2000 milliGRAM(s) IV Intermittent every 8 hours  lactated ringers. 1000 milliLiter(s) (150 mL/Hr) IV Continuous <Continuous>  vancomycin  IVPB 1000 milliGRAM(s) IV Intermittent every 12 hours    MEDICATIONS  (PRN):  acetaminophen     Tablet .. 650 milliGRAM(s) Oral every 6 hours PRN Temp greater or equal to 38C (100.4F), Mild Pain (1 - 3)      PAST MEDICAL & SURGICAL HISTORY:  Breast cancer      Hypertension      S/P hysterectomy          FAMILY HISTORY:      SOCIAL HISTORY: No EtOH, no tobacco    REVIEW OF SYSTEMS:    CONSTITUTIONAL: No weakness, fevers or chills  EYES/ENT: No visual changes;  No vertigo or throat pain   NECK: No pain or stiffness  RESPIRATORY: No cough, wheezing, hemoptysis; No shortness of breath  CARDIOVASCULAR: No chest pain or palpitations  GASTROINTESTINAL: No abdominal or epigastric pain. No nausea, vomiting, or hematemesis; No diarrhea or constipation. No melena or hematochezia.  GENITOURINARY: No dysuria, frequency or hematuria  NEUROLOGICAL: No numbness or weakness  SKIN: No itching, burning, rashes, or lesions   All other review of systems is negative unless indicated above.      Weight (kg): 75 (10-04 @ 18:48)    T(F): 99.5 (10-05-23 @ 05:36), Max: 104 (10-05-23 @ 02:30)  HR: 103 (10-05-23 @ 07:30)  BP: 104/57 (10-05-23 @ 07:30)  RR: 18 (10-05-23 @ 07:30)  SpO2: 98% (10-05-23 @ 07:30)  Wt(kg): --    GENERAL: NAD, well-developed  HEAD:  Atraumatic, Normocephalic  EYES: EOMI, PERRLA, conjunctiva and sclera clear  NECK: Supple, No JVD  CHEST/LUNG: Clear to auscultation bilaterally; No wheeze  HEART: Regular rate and rhythm; No murmurs, rubs, or gallops  ABDOMEN: Soft, Nontender, Nondistended; Bowel sounds present  EXTREMITIES:  2+ Peripheral Pulses, No clubbing, cyanosis, or edema  NEUROLOGY: non-focal  SKIN: No rashes or lesions                          8.6    0.62  )-----------( 31       ( 05 Oct 2023 05:48 )             24.8       10-05    138  |  104  |  6<L>  ----------------------------<  114<H>  3.6   |  25  |  0.6<L>    Ca    8.4      05 Oct 2023 05:48  Mg     1.5     10-05    TPro  5.2<L>  /  Alb  3.4<L>  /  TBili  0.4  /  DBili  x   /  AST  19  /  ALT  19  /  AlkPhos  58  10-05      Magnesium: 1.5 mg/dL (10-05 @ 05:48)

## 2023-10-05 NOTE — ED ADULT NURSE REASSESSMENT NOTE - NS ED NURSE REASSESS COMMENT FT1
PT b/p 84/53 temp 99.5 hr 104, cooling blanket stopped and MD Werner notified of VS, MD Werner at bedside, ordered to increase fluids from 70 to 100 and repeat lactate at this time, results pending, no other new orders at this time, all needs attended.

## 2023-10-05 NOTE — H&P ADULT - ATTENDING COMMENTS
49 y/o F with PMH stage III breast CA on chemo, finished last session on 9/27, hx hysterectomy presenting for body aches, chills, fatigue, runny nose. Found to have neutropenic fever.    1, Neutropenic fever   . hx of Breast Ca stage 3, on chemotherapy (following with Dr. Adams at [facility])  * last chemo on 9/27/2023, febrile since 10/4, tachy, wbc 1100, ANC 20   - Admit to medicine                                           - CXR:  WNL                           - LA: 1.1                 - Urine analysis: nl   - Cont vancomycin and cefepime  - Blood cx:Pending                  - Fungitel: pending   - Pt received 2200ml bolus in the ER. Cont IVF at 150ml/hr   - Procalcitonin:pending   - Heme onc:pending   - Critical care consult appreciated     2. Macrocytic anemia likely secondary to chemotherapy  -check b12 and folate    3. hx of migraine   -stable  -on home propranolol (pt unsure of the dose)    4. Thrombocytopenia might due to chemo  *  Need records to compare  - Hold anticoagulation. SCD     MISC:  DVT ppx: scds  Diet: Regular  Activity: IAT  GI ppx: none 47 y/o F with PMH stage III breast CA on chemo, finished last session on 9/27, hx hysterectomy presenting for body aches, chills, fatigue, runny nose. Found to have neutropenic fever.    1, Neutropenic fever   . hx of Breast Ca stage 3, on chemotherapy (following with Dr. Adams at [facility])  * last chemo on 9/27/2023, febrile since 10/4, tachy, wbc 1100, ANC 20   - Admit to medicine                                           - CXR:  WNL                           - LA: 1.1                 - Urine analysis: nl   - Cont vancomycin and cefepime  - Blood cx:Pending                  - Fungitel: pending   - Pt received 2200ml bolus in the ER. Cont IVF at 150ml/hr   - Procalcitonin:pending   - Heme onc:pending   - Critical care consult appreciated     2. Macrocytic anemia likely secondary to chemotherapy  -check b12 and folate    3. hx of migraine   -stable  -on home propranolol (pt unsure of the dose)    4. Thrombocytopenia might due to chemo  *  Need records to compare. pt reported her thrombocytopenia is usually around 90K  - Hold anticoagulation. SCD     MISC:  DVT ppx: scds  Diet: Regular  Activity: IAT  GI ppx: none 49 y/o F with PMH stage III breast CA on chemo, finished last session on 9/27, hx hysterectomy presenting for body aches, chills, fatigue, runny nose. Found to have neutropenic fever.    1, Neutropenic fever   . hx of Breast Ca stage 3, on chemotherapy (following with Dr. Adams at [facility])  * last chemo on 9/27/2023, febrile since 10/4, tachy, wbc 1100, ANC 20   - Admit to medicine                                           - CXR:  WNL                           - LA: 1.1                 - Urine analysis: nl   - Cont vancomycin and cefepime  - Blood cx:Pending                  - Fungitel: pending   - Pt received 2200ml bolus in the ER. Cont IVF at 150ml/hr   - Procalcitonin:pending   - Heme onc:pending   - ID consult:pending  - Critical care consult appreciated     2. Macrocytic anemia likely secondary to chemotherapy  -check b12 and folate    3. hx of migraine   -stable  -on home propranolol (pt unsure of the dose)    4. Thrombocytopenia might due to chemo  *  Need records to compare. pt reported her thrombocytopenia is usually around 90K  - Hold anticoagulation. SCD     MISC:  DVT ppx: scds  Diet: Regular  Activity: IAT  GI ppx: none

## 2023-10-05 NOTE — CONSULT NOTE ADULT - ASSESSMENT
47 y/o F PMHx migraine, stage III breast CA (following with Dr. Adams at [facility]) chemo, finished last session on 9/27, hx hysterectomy presenting for body aches, chills, fatigue, runny nose since last chemo, worsened last night, with fever since this morning. Admitted for febrile neutropenia      # Febrile neutropenia   # Macrocytic anemia  # Thrombocytopenia  # Stage III breast cancer on chemotherapy   - last chemo 9/27  - WBC 0.62, ANC 0.01   - Hb 8, 10 on admission  - plts 30  - CXR negative, UA negative  - Cx pending   47 y/o F PMHx migraine, stage III breast CA (following with Dr. Adams at [facility]) chemo, finished last session on 9/27, hx hysterectomy presenting for body aches, chills, fatigue, runny nose since last chemo, worsened last night, with fever since this morning. Admitted for febrile neutropenia      # Febrile neutropenia   # Macrocytic anemia  # Thrombocytopenia  # Stage III breast cancer o  - s/p 12 cycles of taxol and 4 cycles of AC, last dose on 9/27  - Received Neulasta on 9/27  - Planned for b/l mastectomy and RT   - last chemo 9/27  - WBC 0.62, ANC 0.01   - Hb 8, 10 on admission  - plts 31  - CXR negative, UA negative  - Cx pending  - No obvious source of infection    49 y/o F PMHx migraine, stage III breast CA (following with Dr. Adams at [facility]) chemo, finished last session on 9/27, hx hysterectomy presenting for body aches, chills, fatigue, runny nose since last chemo, worsened last night, with fever since this morning. Admitted for febrile neutropenia      # Febrile neutropenia   # Macrocytic anemia  # Thrombocytopenia  # Stage III breast cancer o  - s/p 12 cycles of taxol and 4 cycles of AC, last dose on 9/27  - Received Neulasta on 9/27  - Planned for b/l mastectomy and RT   - last chemo 9/27  - WBC 0.62, ANC 0.01   - Hb 8, 10 on admission  - plts 31, no active bleeding   - CXR negative, UA negative  - Cx pending  - No obvious source of infection     Plan:  - Fever likely 2/2 viral illness  - Pt received Neulasta on 9/27  - Monitor for now  - c/w Abx  - f/u blood culture  - Rest of care as per primary team    49 y/o F PMHx migraine, stage III breast CA (following with Dr. Adams at [facility]) chemo, finished last session on 9/27, hx hysterectomy presenting for body aches, chills, fatigue, runny nose since last chemo, worsened last night, with fever since this morning. Admitted for febrile neutropenia      # Febrile neutropenia   # Macrocytic anemia  # Thrombocytopenia  # Stage III breast cancer o  - s/p 12 cycles of taxol and 4 cycles of AC, last dose on 9/27  - Received Neulasta on 9/27  - Planned for b/l mastectomy and RT   - last chemo 9/27  - WBC 0.62, ANC 0.01   - Hb 8, 10 on admission  - plts 31, no active bleeding   - CXR negative, UA negative  - Cx pending  - No obvious source of infection     Plan:  - Fever likely 2/2 viral illness  - Pt received Neulasta on 9/27  - Monitor for ANC now  - c/w Abx  - f/u blood culture  - Rest of care as per primary team

## 2023-10-06 LAB
1,3 BETA GLUCAN SER QL: NEGATIVE — SIGNIFICANT CHANGE UP
ALBUMIN SERPL ELPH-MCNC: 3.3 G/DL — LOW (ref 3.5–5.2)
ALP SERPL-CCNC: 57 U/L — SIGNIFICANT CHANGE UP (ref 30–115)
ALT FLD-CCNC: 17 U/L — SIGNIFICANT CHANGE UP (ref 0–41)
ANION GAP SERPL CALC-SCNC: 10 MMOL/L — SIGNIFICANT CHANGE UP (ref 7–14)
AST SERPL-CCNC: 15 U/L — SIGNIFICANT CHANGE UP (ref 0–41)
BASOPHILS # BLD AUTO: 0.06 K/UL — SIGNIFICANT CHANGE UP (ref 0–0.2)
BASOPHILS NFR BLD AUTO: 6.1 % — HIGH (ref 0–1)
BILIRUB SERPL-MCNC: 0.2 MG/DL — SIGNIFICANT CHANGE UP (ref 0.2–1.2)
BUN SERPL-MCNC: 3 MG/DL — LOW (ref 10–20)
CALCIUM SERPL-MCNC: 8.7 MG/DL — SIGNIFICANT CHANGE UP (ref 8.4–10.5)
CHLORIDE SERPL-SCNC: 106 MMOL/L — SIGNIFICANT CHANGE UP (ref 98–110)
CO2 SERPL-SCNC: 25 MMOL/L — SIGNIFICANT CHANGE UP (ref 17–32)
CREAT SERPL-MCNC: 0.6 MG/DL — LOW (ref 0.7–1.5)
CULTURE RESULTS: SIGNIFICANT CHANGE UP
EGFR: 111 ML/MIN/1.73M2 — SIGNIFICANT CHANGE UP
EOSINOPHIL # BLD AUTO: 0.01 K/UL — SIGNIFICANT CHANGE UP (ref 0–0.7)
EOSINOPHIL NFR BLD AUTO: 1 % — SIGNIFICANT CHANGE UP (ref 0–8)
FUNGITELL: <31 PG/ML — SIGNIFICANT CHANGE UP
GLUCOSE SERPL-MCNC: 112 MG/DL — HIGH (ref 70–99)
HCT VFR BLD CALC: 23.1 % — LOW (ref 37–47)
HCT VFR BLD CALC: 23.4 % — LOW (ref 37–47)
HGB BLD-MCNC: 7.6 G/DL — LOW (ref 12–16)
HGB BLD-MCNC: 7.8 G/DL — LOW (ref 12–16)
LYMPHOCYTES # BLD AUTO: 0.52 K/UL — LOW (ref 1.2–3.4)
LYMPHOCYTES # BLD AUTO: 56.1 % — HIGH (ref 20.5–51.1)
MAGNESIUM SERPL-MCNC: 1.9 MG/DL — SIGNIFICANT CHANGE UP (ref 1.8–2.4)
MCHC RBC-ENTMCNC: 32.9 G/DL — SIGNIFICANT CHANGE UP (ref 32–37)
MCHC RBC-ENTMCNC: 33.3 G/DL — SIGNIFICANT CHANGE UP (ref 32–37)
MCHC RBC-ENTMCNC: 33.5 PG — HIGH (ref 27–31)
MCHC RBC-ENTMCNC: 33.9 PG — HIGH (ref 27–31)
MCV RBC AUTO: 101.7 FL — HIGH (ref 81–99)
MCV RBC AUTO: 101.8 FL — HIGH (ref 81–99)
MONOCYTES # BLD AUTO: 0.03 K/UL — LOW (ref 0.1–0.6)
MONOCYTES NFR BLD AUTO: 3.1 % — SIGNIFICANT CHANGE UP (ref 1.7–9.3)
NEUTROPHILS # BLD AUTO: 0.31 K/UL — LOW (ref 1.4–6.5)
NEUTROPHILS NFR BLD AUTO: 33.7 % — LOW (ref 42.2–75.2)
NRBC # BLD: 0 /100 WBCS — SIGNIFICANT CHANGE UP (ref 0–0)
PLATELET # BLD AUTO: 22 K/UL — LOW (ref 130–400)
PLATELET # BLD AUTO: 25 K/UL — LOW (ref 130–400)
PMV BLD: 12.6 FL — HIGH (ref 7.4–10.4)
PMV BLD: 13.9 FL — HIGH (ref 7.4–10.4)
POTASSIUM SERPL-MCNC: 3.6 MMOL/L — SIGNIFICANT CHANGE UP (ref 3.5–5)
POTASSIUM SERPL-SCNC: 3.6 MMOL/L — SIGNIFICANT CHANGE UP (ref 3.5–5)
PROT SERPL-MCNC: 5.2 G/DL — LOW (ref 6–8)
RBC # BLD: 2.27 M/UL — LOW (ref 4.2–5.4)
RBC # BLD: 2.3 M/UL — LOW (ref 4.2–5.4)
RBC # FLD: 12.6 % — SIGNIFICANT CHANGE UP (ref 11.5–14.5)
RBC # FLD: 12.8 % — SIGNIFICANT CHANGE UP (ref 11.5–14.5)
SODIUM SERPL-SCNC: 141 MMOL/L — SIGNIFICANT CHANGE UP (ref 135–146)
SPECIMEN SOURCE: SIGNIFICANT CHANGE UP
VANCOMYCIN TROUGH SERPL-MCNC: 20.9 UG/ML — HIGH (ref 5–10)
VANCOMYCIN TROUGH SERPL-MCNC: 8.1 UG/ML — SIGNIFICANT CHANGE UP (ref 5–10)
WBC # BLD: 0.87 K/UL — CRITICAL LOW (ref 4.8–10.8)
WBC # BLD: 0.92 K/UL — CRITICAL LOW (ref 4.8–10.8)
WBC # FLD AUTO: 0.87 K/UL — CRITICAL LOW (ref 4.8–10.8)
WBC # FLD AUTO: 0.92 K/UL — CRITICAL LOW (ref 4.8–10.8)

## 2023-10-06 PROCEDURE — 99233 SBSQ HOSP IP/OBS HIGH 50: CPT

## 2023-10-06 RX ORDER — ONDANSETRON 8 MG/1
4 TABLET, FILM COATED ORAL ONCE
Refills: 0 | Status: COMPLETED | OUTPATIENT
Start: 2023-10-06 | End: 2023-10-06

## 2023-10-06 RX ADMIN — Medication 250 MILLIGRAM(S): at 05:54

## 2023-10-06 RX ADMIN — Medication 250 MILLIGRAM(S): at 18:02

## 2023-10-06 RX ADMIN — ONDANSETRON 4 MILLIGRAM(S): 8 TABLET, FILM COATED ORAL at 02:43

## 2023-10-06 RX ADMIN — CEFEPIME 100 MILLIGRAM(S): 1 INJECTION, POWDER, FOR SOLUTION INTRAMUSCULAR; INTRAVENOUS at 12:37

## 2023-10-06 RX ADMIN — Medication 650 MILLIGRAM(S): at 16:46

## 2023-10-06 RX ADMIN — CEFEPIME 100 MILLIGRAM(S): 1 INJECTION, POWDER, FOR SOLUTION INTRAMUSCULAR; INTRAVENOUS at 21:49

## 2023-10-06 RX ADMIN — Medication 650 MILLIGRAM(S): at 17:46

## 2023-10-06 RX ADMIN — Medication 650 MILLIGRAM(S): at 03:20

## 2023-10-06 RX ADMIN — Medication 650 MILLIGRAM(S): at 02:43

## 2023-10-06 RX ADMIN — Medication 30 MILLILITER(S): at 13:49

## 2023-10-06 RX ADMIN — SODIUM CHLORIDE 100 MILLILITER(S): 9 INJECTION, SOLUTION INTRAVENOUS at 09:11

## 2023-10-06 RX ADMIN — CEFEPIME 100 MILLIGRAM(S): 1 INJECTION, POWDER, FOR SOLUTION INTRAMUSCULAR; INTRAVENOUS at 05:54

## 2023-10-06 NOTE — CONSULT NOTE ADULT - ATTENDING COMMENTS
Agree with above A/P
Counseled patient about diagnostic testing and treatment plan. All questions answered. Abnormal lab/radiographical/microbiological data reviewed.

## 2023-10-06 NOTE — CONSULT NOTE ADULT - SUBJECTIVE AND OBJECTIVE BOX
TAL CAMPO  48y, Female  Allergy: No Known Allergies  CHIEF COMPLAINT: Fever (06 Oct 2023 08:22)      HPI:  Mrs. Campo is 49 y/o F with PMH of migraine, stage III breast CA (following with Dr. Adams at [facility]) on chemotherapy( finished last session on 9/27), presented on 10/04 for generalized body aches and fatigue that started since last chemo session, and a fever (103.7) and chills that strated on 10/04. Pt reports constant nausea and vomiting 2/2 chemo but last vomited 3 days ago and has not used antiemetic since then. Denies chest pain, abd pain, cough, SOB, dysuria, diarrhea, back pain, headache, neck pain.    ED:  febrile 89f, tachycardia, low wbc 1100, neutropenia ANC 20  UA negative, RVP negative. BCx and UCx pending.  Started one 1 dose of cefepime and vancomycin  Chest xray- Unremarkable      FAMILY HISTORY:  PAST MEDICAL & SURGICAL HISTORY:  Breast cancer  Hypertension  S/P hysterectomy      SOCIAL HISTORY- Denies      ROS  General: Denies rigors, nightsweats  HEENT: Denies headache, rhinorrhea, sore throat, eye pain  CV: Denies CP, palpitations  PULM: Denies wheezing, hemoptysis  GI: Denies hematemesis, hematochezia, melena  : Denies dysuria, discharge, hematuria  MSK: Denies arthralgias, some myalgias  SKIN: Denies rash, lesions  NEURO: Denies paresthesias, +weakness  PSYCH: Denies depression, anxiety    VITALS:  T(F): 98.5, Max: 100.1 (10-05-23 @ 19:36)  HR: 98  BP: 116/56  RR: 18Vital Signs Last 24 Hrs  SpO2: 98% (06 Oct 2023 07:38) (97% - 100%) room air        PHYSICAL EXAM:  Gen: NAD, resting in bed  HEENT: Normocephalic, atraumatic  Neck: supple, no lymphadenopathy  CV: Regular rate & regular rhythm  Lungs: decreased BS at bases  Abdomen: Soft, BS present  Ext: Med port palpated on Right SVC area, Warm, well perfused  Neuro: non focal, awake  Skin: no rash, no erythema    TESTS & MEASUREMENTS:                        7.6    0.87  )-----------( 22       ( 06 Oct 2023 06:17 )             23.1     10-06    141  |  106  |  3<L>  ----------------------------<  112<H>  3.6   |  25  |  0.6<L>    Ca    8.7      06 Oct 2023 06:17  Mg     1.9     10-06    TPro  5.2<L>  /  Alb  3.3<L>  /  TBili  0.2  /  DBili  x   /  AST  15  /  ALT  17  /  AlkPhos  57  10-06      LIVER FUNCTIONS - ( 06 Oct 2023 06:17 )  Alb: 3.3 g/dL / Pro: 5.2 g/dL / ALK PHOS: 57 U/L / ALT: 17 U/L / AST: 15 U/L / GGT: x           Urinalysis Basic - ( 06 Oct 2023 06:17 )    Color: x / Appearance: x / SG: x / pH: x  Gluc: 112 mg/dL / Ketone: x  / Bili: x / Urobili: x   Blood: x / Protein: x / Nitrite: x   Leuk Esterase: x / RBC: x / WBC x   Sq Epi: x / Non Sq Epi: x / Bacteria: x        Culture - Blood (collected 10-04-23 @ 19:23)  Source: .Blood Blood-Peripheral  Preliminary Report (10-06-23 @ 02:01):    No growth at 24 hours    Culture - Blood (collected 10-04-23 @ 19:23)  Source: .Blood Blood-Peripheral  Preliminary Report (10-06-23 @ 02:01):    No growth at 24 hours    Lactate, Blood: 1.1 mmol/L (10-05-23 @ 06:46)  Blood Gas Venous - Lactate: 1.80 mmol/L (10-04-23 @ 21:11)        RADIOLOGY & ADDITIONAL TESTS:  I have personally reviewed the last Chest xray  CXR- 10/4- No radiographic evidence of acute cardiopulmonary disease.        CARDIOLOGY TESTING  12 Lead ECG:   Sinus tachycardia with occasional Premature ventricular complexes  Nonspecific T wave abnormality        MEDICATIONS  cefepime   IVPB 2000  lactated ringers. 1000  propranolol 60  vancomycin  IVPB 1000      ANTIBIOTICS:  cefepime   IVPB 2000 milliGRAM(s) IV Intermittent every 8 hours  vancomycin  IVPB 1000 milliGRAM(s) IV Intermittent every 12 hours TAL CAMPO  48y, Female  Allergy: No Known Allergies  CHIEF COMPLAINT: Fever (06 Oct 2023 08:22)      HPI:  Mrs. Campo is 49 y/o F with PMH of migraine, stage III breast CA (following with Dr. Adams at [facility]) on chemotherapy( finished last session on ), presented on 10/04 for generalized body aches and fatigue that started since last chemo session, and a fever (103.7) and chills that strated on 10/04. Pt reports constant nausea and vomiting 2/2 chemo but last vomited 3 days ago and has not used antiemetic since then. Denies chest pain, abd pain, cough, SOB, dysuria, diarrhea, back pain, headache, neck pain.    ED:  febrile 89f, tachycardia, low wbc 1100, neutropenia ANC 20  UA negative, RVP negative. BCx and UCx pending.  Started one 1 dose of cefepime and vancomycin  Chest xray- Unremarkable      FAMILY HISTORY:  PAST MEDICAL & SURGICAL HISTORY:  Breast cancer  Hypertension  S/P hysterectomy      SOCIAL HISTORY- Denies      ROS  General: Denies rigors, nightsweats  HEENT: Denies headache, rhinorrhea, sore throat, eye pain  CV: Denies CP, palpitations  PULM: Denies wheezing, hemoptysis  GI: Denies hematemesis, hematochezia, melena  : Denies dysuria, discharge, hematuria  MSK: Denies arthralgias, some myalgias  SKIN: Denies rash, lesions  NEURO: Denies paresthesias, +weakness  PSYCH: Denies depression, anxiety    VITALS:  T(F): 98.5, Max: 100.1 (10-05-23 @ 19:36)  HR: 98  BP: 116/56  RR: 18Vital Signs Last 24 Hrs  SpO2: 98% (06 Oct 2023 07:38) (97% - 100%) room air        PHYSICAL EXAM:  Gen: NAD, resting in bed  HEENT: Normocephalic, atraumatic  Neck: supple, no lymphadenopathy  CV: Regular rate & regular rhythm  Lungs: decreased BS at bases  Abdomen: Soft, BS present  Ext: Med port palpated on Right SVC area, Warm, well perfused  Neuro: non focal, awake  Skin: no rash, no erythema    TESTS & MEASUREMENTS:                        7.6    0.87  )-----------(        ( 06 Oct 2023 06:17 )             23.1     10-06    141  |  106  |  3<L>  ----------------------------<  112<H>  3.6   |  25  |  0.6<L>    Ca    8.7      06 Oct 2023 06:17  Mg     1.9     10-06    TPro  5.2<L>  /  Alb  3.3<L>  /  TBili  0.2  /  DBili  x   /  AST  15  /  ALT  17  /  AlkPhos  57  10-06      LIVER FUNCTIONS - ( 06 Oct 2023 06:17 )  Alb: 3.3 g/dL / Pro: 5.2 g/dL / ALK PHOS: 57 U/L / ALT: 17 U/L / AST: 15 U/L / GGT: x           Urinalysis Basic - Negative  Color: yellow / Appearance: clear / S.009 / pH: 7.5  Gluc: 112 mg/dL / Ketone: neg  / Bili: neg / Urobili: 1.0  Blood: Non hemolyzed trace / Protein: 30 / Nitrite: Neg   Leuk Esterase: Neg / RBC: 1-2 / WBC 0  Sq Epi: present/ Bacteria: few      Culture - Blood (collected 10-04-23 @ 19:23)  Source: .Blood Blood-Peripheral  Preliminary Report (10-06-23 @ 02:01):    No growth at 24 hours    Culture - Blood (collected 10-04-23 @ 19:23)  Source: .Blood Blood-Peripheral  Preliminary Report (10-06-23 @ 02:01):    No growth at 24 hours    Lactate, Blood: 1.1 mmol/L (10-05-23 @ 06:46)  Blood Gas Venous - Lactate: 1.80 mmol/L (10-04-23 @ 21:11)        RADIOLOGY & ADDITIONAL TESTS:  I have personally reviewed the last Chest xray  CXR- 10/4- No radiographic evidence of acute cardiopulmonary disease.      CARDIOLOGY TESTING  12 Lead ECG:   Sinus tachycardia with occasional Premature ventricular complexes  Nonspecific T wave abnormality      MEDICATIONS  cefepime   IVPB 2000  lactated ringers. 1000  propranolol 60  vancomycin  IVPB 1000      ANTIBIOTICS:  cefepime   IVPB 2000 milliGRAM(s) IV Intermittent every 8 hours  vancomycin  IVPB 1000 milliGRAM(s) IV Intermittent every 12 hours TAL CAMPO  48y, Female  Allergy: No Known Allergies  CHIEF COMPLAINT: Fever (06 Oct 2023 08:22)      HPI:  Mrs. Campo is 49 y/o F with PMH of migraine, stage III breast CA (following with Dr. Adams at [facility]) on chemotherapy ( last session on ). She presented on 10/04 for generalized body aches and fatigue that started since last chemo session, with a fever (103.7) and chills that started on 10/04. Pt reports constant nausea and vomiting 2/2 chemo but last vomited 3 days prior and has not used antiemetic since then. Denies chest pain, abd pain, cough, SOB, runny nose, dysuria, diarrhea, rash, back pain, headache, neck pain.    ED:  Febrile 89 F, tachycardia, low wbc 1100, neutropenia ANC 20  UA negative, Covid/influenza/RSV negative. BCx negative, UCx pending.  Started one 1 dose of cefepime and vancomycin  Chest xray- Unremarkable      FAMILY HISTORY:  PAST MEDICAL & SURGICAL HISTORY:  Breast cancer  Hypertension  S/P hysterectomy    SOCIAL HISTORY- Denies    VITALS:  T(F): 98.5, Max: 100.1 (10-05-23 @ 19:36)  HR: 98  BP: 116/56  RR: 18Vital Signs Last 24 Hrs  SpO2: 98% (06 Oct 2023 07:38) (97% - 100%) room air      TESTS & MEASUREMENTS:                        7.6    0.87  )-----------( 22       ( 06 Oct 2023 06:17 )             23.1     10    141  |  106  |  3<L>  ----------------------------<  112<H>  3.6   |  25  |  0.6<L>    Ca    8.7      06 Oct 2023 06:17  Mg     1.9     10-    TPro  5.2<L>  /  Alb  3.3<L>  /  TBili  0.2  /  DBili  x   /  AST  15  /  ALT  17  /  AlkPhos  57  10      LIVER FUNCTIONS - ( 06 Oct 2023 06:17 )  Alb: 3.3 g/dL / Pro: 5.2 g/dL / ALK PHOS: 57 U/L / ALT: 17 U/L / AST: 15 U/L / GGT: x           Urinalysis Basic - Negative  Color: yellow / Appearance: clear / S.009 / pH: 7.5  Gluc: 112 mg/dL / Ketone: neg  / Bili: neg / Urobili: 1.0  Blood: Non hemolyzed trace / Protein: 30 / Nitrite: Neg   Leuk Esterase: Neg / RBC: 1-2 / WBC 0  Sq Epi: present/ Bacteria: few      Culture - Blood (collected 10-04-23 @ 19:23)  Source: .Blood Blood-Peripheral  Preliminary Report (10-06-23 @ 02:01):    No growth at 24 hours    Culture - Blood (collected 10-04-23 @ 19:23)  Source: .Blood Blood-Peripheral  Preliminary Report (10-06-23 @ 02:01):    No growth at 24 hours    Lactate, Blood: 1.1 mmol/L (10-05-23 @ 06:46)  Blood Gas Venous - Lactate: 1.80 mmol/L (10-04-23 @ 21:11)        RADIOLOGY & ADDITIONAL TESTS:  I have personally reviewed the last Chest xray  CXR- 10/4- No radiographic evidence of acute cardiopulmonary disease.      CARDIOLOGY TESTING  12 Lead ECG:   Sinus tachycardia with occasional Premature ventricular complexes  Nonspecific T wave abnormality    MEDICATIONS  cefepime   IVPB 2000  lactated ringers. 1000  propranolol 60  vancomycin  IVPB 1000    ANTIBIOTICS:  cefepime   IVPB 2000 milliGRAM(s) IV Intermittent every 8 hours  vancomycin  IVPB 1000 milliGRAM(s) IV Intermittent every 12 hours    ROS  General: Denies rigors, nightsweats  HEENT: Denies headache, rhinorrhea, sore throat, eye pain  CV: Denies CP, palpitations  PULM: Denies wheezing, hemoptysis  GI: Denies hematemesis, hematochezia, melena  : Denies dysuria, discharge, hematuria  MSK: Denies arthralgias, some myalgias  SKIN: Denies rash, lesions, denies pain at medport site  NEURO: Denies paresthesias, +weakness  PSYCH: Denies depression, anxiety    PHYSICAL EXAM:  Gen: NAD, resting in bed  HEENT: Normocephalic, atraumatic  Neck: supple, no lymphadenopathy  CV: Regular rate & regular rhythm  Lungs: Clear B/L  Abdomen: Soft, BS present  Ext: Warm, well perfused  Neuro: non focal, awake  Skin: no rash, no erythema, Med port palpated on Right SVC area  TAL CAMPO  48y, Female  Allergy: No Known Allergies  CHIEF COMPLAINT: Fever (06 Oct 2023 08:22)      HPI:  Mrs. Campo is 47 y/o F with PMH of migraine, stage III breast CA (following with Dr. Adams at [facility]) on chemotherapy ( last session on ). She presented on 10/04 for generalized body aches and fatigue that started since last chemo session, with a fever (103.7) and chills that started on 10/04. Pt reports constant nausea and vomiting 2/2 chemo but last vomited 3 days prior and has not used antiemetic since then. Denies chest pain, abd pain, cough, SOB, runny nose, dysuria, diarrhea, rash, back pain, headache, neck pain.    ED:  Febrile 89 F, tachycardia, low wbc 1100, neutropenia ANC 20  UA negative, Covid/influenza/RSV negative. BCx negative, UCx pending.  Started one 1 dose of cefepime and vancomycin  Chest xray- Unremarkable      FAMILY HISTORY:  PAST MEDICAL & SURGICAL HISTORY:  Breast cancer  Hypertension  S/P hysterectomy    SOCIAL HISTORY- Denies    VITALS:  T(F): 98.5, Max: 100.1 (10-05-23 @ 19:36)  HR: 98  BP: 116/56  RR: 18Vital Signs Last 24 Hrs  SpO2: 98% (06 Oct 2023 07:38) (97% - 100%) room air      TESTS & MEASUREMENTS:                        7.6    0.87  )-----------( 22       ( 06 Oct 2023 06:17 )             23.1     10    141  |  106  |  3<L>  ----------------------------<  112<H>  3.6   |  25  |  0.6<L>    Ca    8.7      06 Oct 2023 06:17  Mg     1.9     10-    TPro  5.2<L>  /  Alb  3.3<L>  /  TBili  0.2  /  DBili  x   /  AST  15  /  ALT  17  /  AlkPhos  57  10      LIVER FUNCTIONS - ( 06 Oct 2023 06:17 )  Alb: 3.3 g/dL / Pro: 5.2 g/dL / ALK PHOS: 57 U/L / ALT: 17 U/L / AST: 15 U/L / GGT: x           Urinalysis Basic - Negative  Color: yellow / Appearance: clear / S.009 / pH: 7.5  Gluc: 112 mg/dL / Ketone: neg  / Bili: neg / Urobili: 1.0  Blood: Non hemolyzed trace / Protein: 30 / Nitrite: Neg   Leuk Esterase: Neg / RBC: 1-2 / WBC 0  Sq Epi: present/ Bacteria: few      Culture - Blood (collected 10-04-23 @ 19:23)  Source: .Blood Blood-Peripheral  Preliminary Report (10-06-23 @ 02:01):    No growth at 24 hours    Culture - Blood (collected 10-04-23 @ 19:23)  Source: .Blood Blood-Peripheral  Preliminary Report (10-06-23 @ 02:01):    No growth at 24 hours    Lactate, Blood: 1.1 mmol/L (10-05-23 @ 06:46)  Blood Gas Venous - Lactate: 1.80 mmol/L (10-04-23 @ 21:11)        RADIOLOGY & ADDITIONAL TESTS:  I have personally reviewed the last Chest xray  CXR- 10/4- No radiographic evidence of acute cardiopulmonary disease.      CARDIOLOGY TESTING  12 Lead ECG:   Sinus tachycardia with occasional Premature ventricular complexes  Nonspecific T wave abnormality    MEDICATIONS  cefepime   IVPB 2000  lactated ringers. 1000  propranolol 60  vancomycin  IVPB 1000    ANTIBIOTICS:  cefepime   IVPB 2000 milliGRAM(s) IV Intermittent every 8 hours  vancomycin  IVPB 1000 milliGRAM(s) IV Intermittent every 12 hours    ROS  General: Denies rigors, nightsweats, some fatigue  HEENT: Denies headache, rhinorrhea, sore throat, eye pain  CV: Denies CP, palpitations  PULM: Denies wheezing, hemoptysis  GI: Denies hematemesis, hematochezia, melena  : Denies dysuria, discharge, hematuria  MSK: Denies arthralgias, some myalgias  SKIN: Denies rash, lesions, denies pain at medport site  NEURO: Denies paresthesias  PSYCH: Denies depression, anxiety    PHYSICAL EXAM:  Gen: NAD, resting in bed  HEENT: Normocephalic, atraumatic  Neck: supple, no lymphadenopathy  CV: Regular rate & regular rhythm  Lungs: Clear B/L  Abdomen: Soft, BS present  Ext: Warm, well perfused  Neuro: non focal, awake  Skin: no rash, no erythema, Med port palpated on Right SVC area  TAL CAMPO  48y, Female  Allergy: No Known Allergies  CHIEF COMPLAINT: Fever (06 Oct 2023 08:22)      HPI:  Mrs. Campo is 49 y/o F with PMH of migraine, stage III breast CA (following with Dr. Adams at [facility]) on chemotherapy ( last session on ). She presented on 10/04 for generalized body aches and fatigue that started since last chemo session, with a fever (103.7) and chills that started on 10/04. Pt reports nausea and vomiting 2/2 chemo (last vomited 3 days prior and has not used antiemetic since). Denies chest pain, abd pain, cough, SOB, runny nose, dysuria, diarrhea, rash, back pain, headache, neck pain.    ED:  Febrile 89 F, tachycardia, low wbc 1100, neutropenia ANC 20  UA negative, Covid/influenza/RSV negative. BCx negative, UCx pending.  Started one 1 dose of cefepime and vancomycin  Chest xray- Unremarkable      FAMILY HISTORY:  PAST MEDICAL & SURGICAL HISTORY:  Breast cancer  Hypertension  S/P hysterectomy    SOCIAL HISTORY- Denies    VITALS:  T(F): 98.5, Max: 100.1 (10-05-23 @ 19:36)  HR: 98  BP: 116/56  RR: 18Vital Signs Last 24 Hrs  SpO2: 98% (06 Oct 2023 07:38) (97% - 100%) room air      TESTS & MEASUREMENTS:                        7.6    0.87  )-----------( 22       ( 06 Oct 2023 06:17 )             23.1     10-    141  |  106  |  3<L>  ----------------------------<  112<H>  3.6   |  25  |  0.6<L>    Ca    8.7      06 Oct 2023 06:17  Mg     1.9     10-    TPro  5.2<L>  /  Alb  3.3<L>  /  TBili  0.2  /  DBili  x   /  AST  15  /  ALT  17  /  AlkPhos  57  10-      LIVER FUNCTIONS - ( 06 Oct 2023 06:17 )  Alb: 3.3 g/dL / Pro: 5.2 g/dL / ALK PHOS: 57 U/L / ALT: 17 U/L / AST: 15 U/L / GGT: x           Urinalysis Basic - Negative  Color: yellow / Appearance: clear / S.009 / pH: 7.5  Gluc: 112 mg/dL / Ketone: neg  / Bili: neg / Urobili: 1.0  Blood: Non hemolyzed trace / Protein: 30 / Nitrite: Neg   Leuk Esterase: Neg / RBC: 1-2 / WBC 0  Sq Epi: present/ Bacteria: few      Culture - Blood (collected 10-04-23 @ 19:23)  Source: .Blood Blood-Peripheral  Preliminary Report (10-06-23 @ 02:01):    No growth at 24 hours    Culture - Blood (collected 10-04-23 @ 19:23)  Source: .Blood Blood-Peripheral  Preliminary Report (10-06-23 @ 02:01):    No growth at 24 hours    Lactate, Blood: 1.1 mmol/L (10-05-23 @ 06:46)  Blood Gas Venous - Lactate: 1.80 mmol/L (10-04-23 @ 21:11)        RADIOLOGY & ADDITIONAL TESTS:  I have personally reviewed the last Chest xray  CXR- 10/4- No radiographic evidence of acute cardiopulmonary disease.      CARDIOLOGY TESTING  12 Lead ECG:   Sinus tachycardia with occasional Premature ventricular complexes  Nonspecific T wave abnormality    MEDICATIONS  cefepime   IVPB 2000  lactated ringers. 1000  propranolol 60  vancomycin  IVPB 1000    ANTIBIOTICS:  cefepime   IVPB 2000 milliGRAM(s) IV Intermittent every 8 hours  vancomycin  IVPB 1000 milliGRAM(s) IV Intermittent every 12 hours    ROS  General: Denies rigors, nightsweats, some fatigue  HEENT: Denies headache, rhinorrhea, sore throat, eye pain  CV: Denies CP, palpitations  PULM: Denies wheezing, hemoptysis  GI: Denies hematemesis, hematochezia, melena  : Denies dysuria, discharge, hematuria  MSK: Denies arthralgias, some myalgias  SKIN: Denies rash, lesions, denies pain at medport site  NEURO: Denies paresthesias  PSYCH: Denies depression, anxiety    PHYSICAL EXAM:  Gen: NAD, resting in bed  HEENT: Normocephalic, atraumatic  Neck: supple, no lymphadenopathy  CV: Regular rate & regular rhythm  Lungs: Clear B/L  Abdomen: Soft, BS present  Ext: Warm, well perfused  Neuro: non focal, awake  Skin: no rash, no erythema, Med port palpated on Right SVC area

## 2023-10-06 NOTE — CONSULT NOTE ADULT - ASSESSMENT
Mrs. Campo is 49 y/o F with PMH of migraine, stage III breast CA (following with Dr. Adams at [facility]) on chemotherapy ( last session on 9/27), presented on 10/04 for generalized body aches and fatigue that started since last chemo session, with a fever (103.7) and chills that strated on 10/04. Pt reports constant nausea and vomiting 2/2 chemo but last vomited 3 days prior and has not used antiemetic since then. Denies chest pain, abd pain, cough, SOB, dysuria, diarrhea, rash, back pain, headache, neck pain.    ED:  febrile 104, tachycardia, low wbc 1100, neutropenia ANC 20  WBC 0.87   ANC 10   afebrile today Temp 98.5  Denies , GI, SSTI symptoms  UA negative, Blood Cxs from 2 sites neg  CXR unremarkable  Covid 19, Influenza RSV Negative   Urine Cx pending       Impression /  Recommendations:  Cryptogenic SIRS on admission   Continue Cefepime   D/c Vanco    Note Incomplete  No further workup from ID standpoint     Mrs. Campo is 47 y/o F with PMH of migraine, stage III breast CA (following with Dr. Adams at [facility]) on chemotherapy ( last session on 9/27),. ented on 10/04 for generalized body aches and fatigue that started since last chemo session, with a fever (103.7) and chills that started on 10/04. Pt reports constant nausea and vomiting 2/2 chemo but last vomited 3 days prior and has not used antiemetic since then.     Denies chest pain, abd pain, cough, SOB, dysuria, diarrhea, rash, back pain, headache, neck pain.  Per Patient, her 13 y/o son had a fever last Friday    c/o of some weakness, otherwise asymptomatic today  WBC 0.87   ANC 10   afebrile today Temp 98.5  UA negative, Blood Cxs from 2 sites neg  CXR unremarkable  Covid 19, Influenza RSV Negative       Impression /  Recommendations:  Cryptogenic SIRS on admission   Continue Cefepime IVBP 2000MG q 8h  D/c Vancomycin  F/u Urine cx, and fungal workup  No further workup from ID standpoint  Oncology recs to boost WBC     Mrs. Campo is 49 y/o F with PMH of migraine, stage III breast CA (following with Dr. Adams at [facility]) on chemotherapy ( last session on 9/27). She presented on 10/04 for generalized body aches and fatigue that started since last chemo session, with a fever (103.7) and chills that started on 10/04. Pt reports constant nausea and vomiting 2/2 chemo but last vomited 3 days prior and has not used antiemetic since then.     Denies chest pain, abd pain, cough, SOB, runny nose, dysuria, diarrhea, rash, back pain, headache, neck pain.  Per Patient, her 13 y/o son had a fever last Friday    c/o of some weakness, otherwise asymptomatic today  WBC 0.87   ANC 10   afebrile today Temp 98.5  UA negative, Blood Cxs from 2 sites neg  CXR unremarkable  Covid 19, Influenza RSV Negative       Impression /  Recommendations:  Cryptogenic SIRS on admission   Continue Cefepime IVBP 2000MG q 8h  D/c Vancomycin  F/u Urine cx, and fungal workup  No further workup from ID standpoint  Oncology recs to boost WBC     Mrs. Campo is 49 y/o F with PMH of migraine, stage III breast CA (following with Dr. Adams at [facility]) on chemotherapy ( last session on 9/27). She presented on 10/04 for generalized body aches and fatigue that started since last chemo session, with a fever (103.7) and chills that started on 10/04. Pt reports constant nausea and vomiting 2/2 chemo (last vomited 3 days prior and has not used antiemetic since then).     Denies chest pain, abd pain, cough, SOB, runny nose, dysuria, diarrhea, rash, back pain, headache, neck pain.  Per Patient, her 13 y/o son had a fever last Friday    Patient states she feels better today, has some fatigue, otherwise asymptomatic   WBC 0.87 Hgb 7.8 CR 0.6  ANC 10   afebrile Temp 98.5 F  UA negative, Blood Cxs from 2 sites negative  CXR unremarkable  Covid 19, Influenza RSV Negative       Impression /  Recommendations:  Cryptogenic SIRS on admission   Continue Cefepime IVBP 2000MG q 8h  D/c Vancomycin  F/u Urine cx, and fungal workup  No further workup from ID standpoint  Oncology recs to boost WBC     Mrs. Campo is 49 y/o F with PMH of migraine, stage III breast CA (following with Dr. Adams at [facility]) on chemotherapy ( last session on 9/27). She presented on 10/04 for generalized body aches and fatigue that started since last chemo session, with a fever (103.7) and chills that started on 10/04. Pt reports nausea and vomiting 2/2 chemo (last vomited 3 days prior and has not used antiemetic since).     Denies chest pain, abd pain, cough, SOB, dysuria, diarrhea, rash, back pain, headache, neck pain.  Per Patient, her 11 y/o son had a fever last Friday    Patient states she feels better today  WBC 0.87 Hgb 7.8 CR 0.6  ANC 10   afebrile Temp 98.5 F  UA negative, Blood Cxs from 2 sites negative  CXR unremarkable  Covid 19, Influenza RSV Negative       Impression /  Recommendations:  Cryptogenic SIRS on admission   Medoport on Right SVC area, no pain  Continue Cefepime IVBP 2000MG q 8h  D/c Vancomycin  F/u Urine cx, and fungal workup  No further workup from ID standpoint  Oncology recs to boost WBC     Mrs. Campo is 47 y/o F with PMH of migraine, stage III breast CA (following with Dr. Adams at [facility]) on chemotherapy ( last session on 9/27). She presented on 10/04 for generalized body aches and fatigue that started since last chemo session, with a fever (103.7) and chills that started on 10/04. Pt reports nausea and vomiting 2/2 chemo (last vomited 3 days prior and has not used antiemetic since).     Denies chest pain, abd pain, cough, SOB, dysuria, diarrhea, rash, back pain, headache, neck pain.  Per Patient, her 13 y/o son had a fever last Friday    Patient states she feels better today  WBC 0.87 Hgb 7.8 CR 0.6  ANC 10   afebrile Temp 98.5 F  UA negative, Blood Cxs from 2 sites negative  CXR unremarkable  Covid 19, Influenza RSV Negative       IMPRESSION/RECOMMENDATIONS   Cryptogenic SIRS with absolute neutropenia  Medoport on Right SVC area, no pain  No obvious focus of infection  Clinically stable  -BCX  -Vancomycin 1 gm iv q12h  -cefepime 2 gm iv q8h

## 2023-10-06 NOTE — PROGRESS NOTE ADULT - SUBJECTIVE AND OBJECTIVE BOX
TAL MANCILLA  48y  [facility]-N ED Hold 018 A      Patient is a 48y old  Female who presents with a chief complaint of Fever (05 Oct 2023 10:11)      INTERVAL HPI/OVERNIGHT EVENTS:        REVIEW OF SYSTEMS:        FAMILY HISTORY:    T(C): 36.9 (10-06-23 @ 07:38), Max: 37.8 (10-05-23 @ 19:36)  HR: 98 (10-06-23 @ 07:38) (98 - 112)  BP: 116/56 (10-06-23 @ 07:38) (106/65 - 131/66)  RR: 18 (10-06-23 @ 07:38) (18 - 18)  SpO2: 98% (10-06-23 @ 07:38) (97% - 100%)  Wt(kg): --Vital Signs Last 24 Hrs  T(C): 36.9 (06 Oct 2023 07:38), Max: 37.8 (05 Oct 2023 19:36)  T(F): 98.5 (06 Oct 2023 07:38), Max: 100.1 (05 Oct 2023 19:36)  HR: 98 (06 Oct 2023 07:38) (98 - 112)  BP: 116/56 (06 Oct 2023 07:38) (106/65 - 131/66)  BP(mean): 79 (06 Oct 2023 07:38) (79 - 82)  RR: 18 (06 Oct 2023 07:38) (18 - 18)  SpO2: 98% (06 Oct 2023 07:38) (97% - 100%)    Parameters below as of 06 Oct 2023 07:38  Patient On (Oxygen Delivery Method): room air        PHYSICAL EXAM:  GENERAL: NAD, well-groomed, well-developed  NERVOUS SYSTEM:  Alert & Oriented X3,   PULM: Clear to auscultation bilaterally  CARDIAC: Regular rate and rhythm;  GI: Soft, Nontender, Nondistended; Bowel sounds present  EXTREMITIES:  2+ Peripheral Pulses,     Consultant(s) Notes Reviewed:  [x ] YES  [ ] NO  Care Discussed with Consultants/Other Providers [ x] YES  [ ] NO    LABS:                            7.6    0.87  )-----------( 22       ( 06 Oct 2023 06:17 )             23.1   10-06    141  |  106  |  3<L>  ----------------------------<  112<H>  3.6   |  25  |  0.6<L>    Ca    8.7      06 Oct 2023 06:17  Mg     1.9     10-06    TPro  5.2<L>  /  Alb  3.3<L>  /  TBili  0.2  /  DBili  x   /  AST  15  /  ALT  17  /  AlkPhos  57  10-06            Culture - Blood (collected 04 Oct 2023 19:23)  Source: .Blood Blood-Peripheral  Preliminary Report (06 Oct 2023 02:01):    No growth at 24 hours    Culture - Blood (collected 04 Oct 2023 19:23)  Source: .Blood Blood-Peripheral  Preliminary Report (06 Oct 2023 02:01):    No growth at 24 hours      acetaminophen     Tablet .. 650 milliGRAM(s) Oral every 6 hours PRN  cefepime   IVPB 2000 milliGRAM(s) IV Intermittent every 8 hours  lactated ringers. 1000 milliLiter(s) IV Continuous <Continuous>  propranolol 60 milliGRAM(s) Oral daily  vancomycin  IVPB 1000 milliGRAM(s) IV Intermittent every 12 hours      47 y/o F with PMH stage III breast CA on chemo, finished last session on 9/27, hx hysterectomy presenting for body aches, chills, fatigue, runny nose. Found to have neutropenic fever.    1, Neutropenic fever   . hx of Breast Ca stage 3, on chemotherapy (following with Dr. Adams at [facility])  * last chemo on 9/27/2023, febrile since 10/4, tachy, wbc 1100, ANC 20   - Admit to medicine                                           - CXR:  WNL                           - LA: 1.1                 - Urine analysis: nl   - Cont vancomycin and cefepime  - Blood cx:Pending                  - Fungitel: pending   - Pt received 2200ml bolus in the ER. Cont IVF at lower dose 100ml/hr   - Procalcitonin:pending   - Heme onc consult appreciated   - ID consult:pending  - Critical care consult appreciated     2. Macrocytic anemia likely secondary to chemotherapy  - Vit b12: nl                  - Folate:nl     3. hx of migraine   - Cont propanolol     4. Thrombocytopenia might due to chemo  *  Need records to compare. pt reported her thrombocytopenia is usually around 90K  - Hold anticoagulation. SCD     MISC:  DVT ppx: scds  Diet: Regular  Activity: IAT  GI ppx: none.      TAL MANCILLA  48y  [facility]-N ED Hold 018 A      Patient is a 48y old  Female who presents with a chief complaint of Fever (05 Oct 2023 10:11)      INTERVAL HPI/OVERNIGHT EVENTS:      Patient reported mild improvement. still with poor appetite had one episode of vomiting this morning. no other events noted         FAMILY HISTORY:    T(C): 36.9 (10-06-23 @ 07:38), Max: 37.8 (10-05-23 @ 19:36)  HR: 98 (10-06-23 @ 07:38) (98 - 112)  BP: 116/56 (10-06-23 @ 07:38) (106/65 - 131/66)  RR: 18 (10-06-23 @ 07:38) (18 - 18)  SpO2: 98% (10-06-23 @ 07:38) (97% - 100%)  Wt(kg): --Vital Signs Last 24 Hrs  T(C): 36.9 (06 Oct 2023 07:38), Max: 37.8 (05 Oct 2023 19:36)  T(F): 98.5 (06 Oct 2023 07:38), Max: 100.1 (05 Oct 2023 19:36)  HR: 98 (06 Oct 2023 07:38) (98 - 112)  BP: 116/56 (06 Oct 2023 07:38) (106/65 - 131/66)  BP(mean): 79 (06 Oct 2023 07:38) (79 - 82)  RR: 18 (06 Oct 2023 07:38) (18 - 18)  SpO2: 98% (06 Oct 2023 07:38) (97% - 100%)    Parameters below as of 06 Oct 2023 07:38  Patient On (Oxygen Delivery Method): room air        PHYSICAL EXAM:  GENERAL: NAD, well-groomed, well-developed  NERVOUS SYSTEM:  Alert & Oriented X3,   PULM: Clear to auscultation bilaterally  CARDIAC: Regular rate and rhythm;  GI: Soft, Nontender, Nondistended; Bowel sounds present  EXTREMITIES:  2+ Peripheral Pulses,     Consultant(s) Notes Reviewed:  [x ] YES  [ ] NO  Care Discussed with Consultants/Other Providers [ x] YES  [ ] NO    LABS:                            7.6    0.87  )-----------( 22       ( 06 Oct 2023 06:17 )             23.1   10-06    141  |  106  |  3<L>  ----------------------------<  112<H>  3.6   |  25  |  0.6<L>    Ca    8.7      06 Oct 2023 06:17  Mg     1.9     10-06    TPro  5.2<L>  /  Alb  3.3<L>  /  TBili  0.2  /  DBili  x   /  AST  15  /  ALT  17  /  AlkPhos  57  10-06            Culture - Blood (collected 04 Oct 2023 19:23)  Source: .Blood Blood-Peripheral  Preliminary Report (06 Oct 2023 02:01):    No growth at 24 hours    Culture - Blood (collected 04 Oct 2023 19:23)  Source: .Blood Blood-Peripheral  Preliminary Report (06 Oct 2023 02:01):    No growth at 24 hours      acetaminophen     Tablet .. 650 milliGRAM(s) Oral every 6 hours PRN  cefepime   IVPB 2000 milliGRAM(s) IV Intermittent every 8 hours  lactated ringers. 1000 milliLiter(s) IV Continuous <Continuous>  propranolol 60 milliGRAM(s) Oral daily  vancomycin  IVPB 1000 milliGRAM(s) IV Intermittent every 12 hours      47 y/o F with PMH stage III breast CA on chemo, finished last session on 9/27, hx hysterectomy presenting for body aches, chills, fatigue, runny nose. Found to have neutropenic fever.    1, Neutropenic fever   . hx of Breast Ca stage 3, on chemotherapy (following with Dr. Adams at [facility])  * last chemo on 9/27/2023, febrile since 10/4, tachy, wbc 1100, ANC 20   - Admit to medicine                                           - CXR:  WNL                           - LA: 1.1                 - Urine analysis: nl   - Cont vancomycin and cefepime  - Blood cx:NTD                 - Fungitel: pending   - Pt received 2200ml bolus in the ER. Cont IVF at lower dose 100ml/hr   - Procalcitonin:pending   - Heme onc consult appreciated   - ID consult appreciated   - Critical care consult appreciated     2. Macrocytic anemia likely secondary to chemotherapy  - Vit b12: nl                  - Folate:nl     3. hx of migraine   - Cont propanolol     4. Thrombocytopenia might due to chemo  *  Need records to compare. pt reported her thrombocytopenia is usually around 90K  - Hold anticoagulation. SCD     MISC:  DVT ppx: scds  Diet: Regular  Activity: IAT  GI ppx: none.     Sepsis unclear focus associated with neutropenia. improving. still with abn tp. ID is following  thrombocytopenia due to infection. scd in lieu of anticoag. close follow up. consider dic panel if it get worse. heme following

## 2023-10-07 LAB
ALBUMIN SERPL ELPH-MCNC: 3.4 G/DL — LOW (ref 3.5–5.2)
ALP SERPL-CCNC: 51 U/L — SIGNIFICANT CHANGE UP (ref 30–115)
ALT FLD-CCNC: 14 U/L — SIGNIFICANT CHANGE UP (ref 0–41)
ANION GAP SERPL CALC-SCNC: 10 MMOL/L — SIGNIFICANT CHANGE UP (ref 7–14)
AST SERPL-CCNC: 15 U/L — SIGNIFICANT CHANGE UP (ref 0–41)
BASOPHILS # BLD AUTO: 0.04 K/UL — SIGNIFICANT CHANGE UP (ref 0–0.2)
BASOPHILS NFR BLD AUTO: 3.1 % — HIGH (ref 0–1)
BILIRUB SERPL-MCNC: <0.2 MG/DL — SIGNIFICANT CHANGE UP (ref 0.2–1.2)
BUN SERPL-MCNC: <3 MG/DL — LOW (ref 10–20)
CALCIUM SERPL-MCNC: 8.5 MG/DL — SIGNIFICANT CHANGE UP (ref 8.4–10.5)
CHLORIDE SERPL-SCNC: 101 MMOL/L — SIGNIFICANT CHANGE UP (ref 98–110)
CO2 SERPL-SCNC: 24 MMOL/L — SIGNIFICANT CHANGE UP (ref 17–32)
CREAT SERPL-MCNC: 0.6 MG/DL — LOW (ref 0.7–1.5)
D DIMER BLD IA.RAPID-MCNC: 506 NG/ML DDU — HIGH
EGFR: 111 ML/MIN/1.73M2 — SIGNIFICANT CHANGE UP
EOSINOPHIL # BLD AUTO: 0 K/UL — SIGNIFICANT CHANGE UP (ref 0–0.7)
EOSINOPHIL NFR BLD AUTO: 0 % — SIGNIFICANT CHANGE UP (ref 0–8)
GLUCOSE SERPL-MCNC: 139 MG/DL — HIGH (ref 70–99)
HCT VFR BLD CALC: 22.9 % — LOW (ref 37–47)
HGB BLD-MCNC: 7.6 G/DL — LOW (ref 12–16)
IMM GRANULOCYTES NFR BLD AUTO: 0.8 % — HIGH (ref 0.1–0.3)
LYMPHOCYTES # BLD AUTO: 0.49 K/UL — LOW (ref 1.2–3.4)
LYMPHOCYTES # BLD AUTO: 38.6 % — SIGNIFICANT CHANGE UP (ref 20.5–51.1)
MAGNESIUM SERPL-MCNC: 1.6 MG/DL — LOW (ref 1.8–2.4)
MCHC RBC-ENTMCNC: 33.2 G/DL — SIGNIFICANT CHANGE UP (ref 32–37)
MCHC RBC-ENTMCNC: 33.9 PG — HIGH (ref 27–31)
MCV RBC AUTO: 102.2 FL — HIGH (ref 81–99)
MONOCYTES # BLD AUTO: 0.11 K/UL — SIGNIFICANT CHANGE UP (ref 0.1–0.6)
MONOCYTES NFR BLD AUTO: 8.7 % — SIGNIFICANT CHANGE UP (ref 1.7–9.3)
NEUTROPHILS # BLD AUTO: 0.62 K/UL — LOW (ref 1.4–6.5)
NEUTROPHILS NFR BLD AUTO: 48.8 % — SIGNIFICANT CHANGE UP (ref 42.2–75.2)
NRBC # BLD: 0 /100 WBCS — SIGNIFICANT CHANGE UP (ref 0–0)
PLATELET # BLD AUTO: 29 K/UL — LOW (ref 130–400)
PMV BLD: 13.7 FL — HIGH (ref 7.4–10.4)
POTASSIUM SERPL-MCNC: 3.5 MMOL/L — SIGNIFICANT CHANGE UP (ref 3.5–5)
POTASSIUM SERPL-SCNC: 3.5 MMOL/L — SIGNIFICANT CHANGE UP (ref 3.5–5)
PROT SERPL-MCNC: 5.1 G/DL — LOW (ref 6–8)
RBC # BLD: 2.24 M/UL — LOW (ref 4.2–5.4)
RBC # FLD: 12.7 % — SIGNIFICANT CHANGE UP (ref 11.5–14.5)
SODIUM SERPL-SCNC: 135 MMOL/L — SIGNIFICANT CHANGE UP (ref 135–146)
WBC # BLD: 1.27 K/UL — LOW (ref 4.8–10.8)
WBC # FLD AUTO: 1.27 K/UL — LOW (ref 4.8–10.8)

## 2023-10-07 PROCEDURE — 99233 SBSQ HOSP IP/OBS HIGH 50: CPT

## 2023-10-07 PROCEDURE — 93010 ELECTROCARDIOGRAM REPORT: CPT

## 2023-10-07 RX ORDER — MEROPENEM 1 G/30ML
1000 INJECTION INTRAVENOUS EVERY 8 HOURS
Refills: 0 | Status: DISCONTINUED | OUTPATIENT
Start: 2023-10-07 | End: 2023-10-10

## 2023-10-07 RX ORDER — DIPHENHYDRAMINE HYDROCHLORIDE AND LIDOCAINE HYDROCHLORIDE AND ALUMINUM HYDROXIDE AND MAGNESIUM HYDRO
10 KIT EVERY 6 HOURS
Refills: 0 | Status: DISCONTINUED | OUTPATIENT
Start: 2023-10-07 | End: 2023-10-10

## 2023-10-07 RX ORDER — MAGNESIUM SULFATE 500 MG/ML
2 VIAL (ML) INJECTION ONCE
Refills: 0 | Status: COMPLETED | OUTPATIENT
Start: 2023-10-07 | End: 2023-10-07

## 2023-10-07 RX ADMIN — MEROPENEM 100 MILLIGRAM(S): 1 INJECTION INTRAVENOUS at 22:13

## 2023-10-07 RX ADMIN — Medication 25 GRAM(S): at 19:04

## 2023-10-07 RX ADMIN — MEROPENEM 100 MILLIGRAM(S): 1 INJECTION INTRAVENOUS at 14:23

## 2023-10-07 RX ADMIN — CEFEPIME 100 MILLIGRAM(S): 1 INJECTION, POWDER, FOR SOLUTION INTRAMUSCULAR; INTRAVENOUS at 05:41

## 2023-10-07 RX ADMIN — Medication 250 MILLIGRAM(S): at 17:31

## 2023-10-07 RX ADMIN — DIPHENHYDRAMINE HYDROCHLORIDE AND LIDOCAINE HYDROCHLORIDE AND ALUMINUM HYDROXIDE AND MAGNESIUM HYDRO 10 MILLILITER(S): KIT at 14:22

## 2023-10-07 RX ADMIN — Medication 650 MILLIGRAM(S): at 19:03

## 2023-10-07 RX ADMIN — SODIUM CHLORIDE 100 MILLILITER(S): 9 INJECTION, SOLUTION INTRAVENOUS at 02:00

## 2023-10-07 RX ADMIN — Medication 250 MILLIGRAM(S): at 06:43

## 2023-10-07 RX ADMIN — Medication 650 MILLIGRAM(S): at 01:54

## 2023-10-07 RX ADMIN — DIPHENHYDRAMINE HYDROCHLORIDE AND LIDOCAINE HYDROCHLORIDE AND ALUMINUM HYDROXIDE AND MAGNESIUM HYDRO 10 MILLILITER(S): KIT at 23:10

## 2023-10-07 RX ADMIN — Medication 650 MILLIGRAM(S): at 02:53

## 2023-10-07 NOTE — PROGRESS NOTE ADULT - SUBJECTIVE AND OBJECTIVE BOX
LEONARDO MANCILLASANA  [facility]-N 4A 029 A ([facility]N 4A)      Patient is a 48y old  Female who presents with a chief complaint of Fever (07 Oct 2023 13:03)        Interval events:  Patient seen and examined at bedside. Spiked fevers to 101.4F last night. Patient says she feels weak and nauseous but hasn't vomited in 2 days. C/o painful sores in mouth. No cough, chest pain, SOB, abd pain, diarrhea.     -PMHx: Breast cancer    Hypertension      -PSHx:S/P hysterectomy            REVIEW OF SYSTEMS:  CONSTITUTIONAL: as above   RESPIRATORY: No cough, wheezing, chills or hemoptysis; No shortness of breath  CARDIOVASCULAR: No chest pain, palpitations, dizziness, or leg swelling  GASTROINTESTINAL: as above    NEUROLOGICAL: No headaches  LYMPH NODES: No enlarged glands  MUSCULOSKELETAL: No joint pain or swelling; No muscle, back, or extremity pain      T(C): , Max: 38.6 (10-06-23 @ 16:56)  HR: 91 (10-07-23 @ 12:53)  BP: 106/51 (10-07-23 @ 12:53)  RR: 18 (10-07-23 @ 12:53)  SpO2: 99% (10-07-23 @ 01:41)  CAPILLARY BLOOD GLUCOSE      PHYSICAL EXAM:  GENERAL: NAD, lying in bed comfortably  HEAD:  Atraumatic, Normocephalic  EYES: EOMI, PERRLA, conjunctiva and sclera clear  ENT: +mucositis; no thrush   NECK: Supple, No JVD  CHEST/LUNG: Clear to auscultation bilaterally; No rales, rhonchi, wheezing, or rubs. Unlabored respirations; Medoport right SVC area no pain/erythema   HEART: Regular rate and rhythm; No murmurs, rubs, or gallops  ABDOMEN: Bowel sounds present; Soft, Nontender, Nondistended. No hepatomegaly  EXTREMITIES:  2+ Peripheral Pulses, brisk capillary refill. No clubbing, cyanosis, or edema  NERVOUS SYSTEM:  Alert & Oriented X3, speech clear. No deficits   MSK: FROM all 4 extremities, full and equal strength  SKIN: No rashes or lesions    Consultant(s) Notes Reviewed:  [x ] YES  [ ] NO  Care Discussed with Consultants/Other Providers [ x] YES  [ ] NO      LABS:          7.6  1.27  )-------(29          22.9  N=48.8  L=38.6  HTT=451.2    135|101|<3<L>  ------------------<139<H>  3.5|24|0.6<L>  eGFR:--  Ca:8.5            Microbiology:    Culture - Urine (collected 10-04-23 @ 20:48)  Source: Clean Catch Clean Catch (Midstream)  Final Report (10-06-23 @ 14:27):    <10,000 CFU/mL Normal Urogenital Lyla    Culture - Blood (collected 10-04-23 @ 19:23)  Source: .Blood Blood-Peripheral  Preliminary Report (10-07-23 @ 02:02):    No growth at 48 Hours    Culture - Blood (collected 10-04-23 @ 19:23)  Source: .Blood Blood-Peripheral  Preliminary Report (10-07-23 @ 02:02):    No growth at 48 Hours        RADIOLOGY & ADDITIONAL TESTS:  < from: Xray Chest 1 View- PORTABLE-Urgent (10.04.23 @ 20:16) >  Impression:    No radiographic evidence of acute cardiopulmonary disease.    < end of copied text >    < from: 12 Lead ECG (10.04.23 @ 21:58) >    Diagnosis Line Sinus tachycardia with occasional Premature ventricular complexes  Nonspecific T wave abnormality  Abnormal ECG    < end of copied text >        Medications:  acetaminophen     Tablet .. 650 milliGRAM(s) Oral every 6 hours PRN  bismuth subsalicylate Liquid 30 milliLiter(s) Oral every 8 hours PRN  FIRST- Mouthwash  BLM 10 milliLiter(s) Swish and Spit every 6 hours  lactated ringers. 1000 milliLiter(s) IV Continuous <Continuous>  meropenem  IVPB 1000 milliGRAM(s) IV Intermittent every 8 hours  propranolol 60 milliGRAM(s) Oral daily  vancomycin  IVPB 1000 milliGRAM(s) IV Intermittent every 12 hours         TAL MANCILLA  [facility]-N 4A 029 A ([facility] 4A)      Patient is a 48y old  Female who presents with a chief complaint of Fever (07 Oct 2023 13:03)    My note supersedes ALL resident notes that I sign.  My corrections for their notes are in my note.        Interval events:  Patient seen and examined at bedside. Spiked fevers to 101.4F last night. Patient says she feels weak and nauseous but hasn't vomited in 2 days. C/o painful sores in mouth. No cough, chest pain, SOB, abd pain, diarrhea.     -PMHx: Breast cancer    Hypertension      -PSHx:S/P hysterectomy            REVIEW OF SYSTEMS:  CONSTITUTIONAL: as above   RESPIRATORY: No cough, wheezing, chills or hemoptysis; No shortness of breath  CARDIOVASCULAR: No chest pain, palpitations, dizziness, or leg swelling  GASTROINTESTINAL: as above    NEUROLOGICAL: No headaches  LYMPH NODES: No enlarged glands  MUSCULOSKELETAL: No joint pain or swelling; No muscle, back, or extremity pain      T(C): , Max: 38.6 (10-06-23 @ 16:56)  HR: 91 (10-07-23 @ 12:53)  BP: 106/51 (10-07-23 @ 12:53)  RR: 18 (10-07-23 @ 12:53)  SpO2: 99% (10-07-23 @ 01:41)  CAPILLARY BLOOD GLUCOSE      PHYSICAL EXAM:  GENERAL: NAD, lying in bed comfortably  HEAD:  Atraumatic, Normocephalic  EYES: EOMI, PERRLA, conjunctiva and sclera clear  ENT: +mucositis; no thrush   NECK: Supple, No JVD  CHEST/LUNG: Clear to auscultation bilaterally; No rales, rhonchi, wheezing, or rubs. Unlabored respirations; Medoport right SVC area no pain/erythema   HEART: Regular rate and rhythm; No murmurs, rubs, or gallops  ABDOMEN: Bowel sounds present; Soft, Nontender, Nondistended. No hepatomegaly  EXTREMITIES:  2+ Peripheral Pulses, brisk capillary refill. No clubbing, cyanosis, or edema  NERVOUS SYSTEM:  Alert & Oriented X3, speech clear. No deficits   MSK: FROM all 4 extremities, full and equal strength  SKIN: No rashes or lesions    Consultant(s) Notes Reviewed:  [x ] YES  [ ] NO  Care Discussed with Consultants/Other Providers [ x] YES  [ ] NO      LABS:          7.6  1.27  )-------(29          22.9  N=48.8  L=38.6  IOA=519.2    135|101|<3<L>  ------------------<139<H>  3.5|24|0.6<L>  eGFR:--  Ca:8.5            Microbiology:    Culture - Urine (collected 10-04-23 @ 20:48)  Source: Clean Catch Clean Catch (Midstream)  Final Report (10-06-23 @ 14:27):    <10,000 CFU/mL Normal Urogenital Lyla    Culture - Blood (collected 10-04-23 @ 19:23)  Source: .Blood Blood-Peripheral  Preliminary Report (10-07-23 @ 02:02):    No growth at 48 Hours    Culture - Blood (collected 10-04-23 @ 19:23)  Source: .Blood Blood-Peripheral  Preliminary Report (10-07-23 @ 02:02):    No growth at 48 Hours        RADIOLOGY & ADDITIONAL TESTS:  < from: Xray Chest 1 View- PORTABLE-Urgent (10.04.23 @ 20:16) >  Impression:    No radiographic evidence of acute cardiopulmonary disease.    < end of copied text >    < from: 12 Lead ECG (10.04.23 @ 21:58) >    Diagnosis Line Sinus tachycardia with occasional Premature ventricular complexes  Nonspecific T wave abnormality  Abnormal ECG    < end of copied text >        Medications:  acetaminophen     Tablet .. 650 milliGRAM(s) Oral every 6 hours PRN  bismuth subsalicylate Liquid 30 milliLiter(s) Oral every 8 hours PRN  FIRST- Mouthwash  BLM 10 milliLiter(s) Swish and Spit every 6 hours  lactated ringers. 1000 milliLiter(s) IV Continuous <Continuous>  meropenem  IVPB 1000 milliGRAM(s) IV Intermittent every 8 hours  propranolol 60 milliGRAM(s) Oral daily  vancomycin  IVPB 1000 milliGRAM(s) IV Intermittent every 12 hours

## 2023-10-07 NOTE — DIETITIAN INITIAL EVALUATION ADULT - PERTINENT MEDS FT
MEDICATIONS  (STANDING):  FIRST- Mouthwash  BLM 10 milliLiter(s) Swish and Spit every 6 hours  lactated ringers. 1000 milliLiter(s) (100 mL/Hr) IV Continuous <Continuous>  meropenem  IVPB 1000 milliGRAM(s) IV Intermittent every 8 hours  propranolol 60 milliGRAM(s) Oral daily  vancomycin  IVPB 1000 milliGRAM(s) IV Intermittent every 12 hours    MEDICATIONS  (PRN):  acetaminophen     Tablet .. 650 milliGRAM(s) Oral every 6 hours PRN Temp greater or equal to 38C (100.4F), Mild Pain (1 - 3)  bismuth subsalicylate Liquid 30 milliLiter(s) Oral every 8 hours PRN Diarrhea  trimethobenzamide Injectable 200 milliGRAM(s) IntraMuscular every 12 hours PRN Nausea and/or Vomiting

## 2023-10-07 NOTE — DIETITIAN INITIAL EVALUATION ADULT - PERTINENT LABORATORY DATA
10-07    135  |  101  |  <3<L>  ----------------------------<  139<H>  3.5   |  24  |  0.6<L>    Ca    8.5      07 Oct 2023 07:49  Mg     1.6     10-07    TPro  5.1<L>  /  Alb  3.4<L>  /  TBili  <0.2  /  DBili  x   /  AST  15  /  ALT  14  /  AlkPhos  51  10-07

## 2023-10-07 NOTE — DIETITIAN INITIAL EVALUATION ADULT - [id number]_GEN_A_CORE
Dx: 47y/o female with h/o stage III breast cancer on chemo, finished last session (9/27) and hysterectomy, presented for body aches, chills, fatigue, runny nose. Found to have neutropenic fever. Noted to have cryptogenic SIRS with absolute neutropenia, mucositis, nausea, macrocytic anemia and thrombocytopenia- likely from chemo/possible viral infection.

## 2023-10-07 NOTE — PROGRESS NOTE ADULT - SUBJECTIVE AND OBJECTIVE BOX
24H events:    Patient is a 48y old Female who presents with a chief complaint of Fever (06 Oct 2023 12:00)    Primary diagnosis of Neutropenic fever      Day 1:  Day 2:  Day 3:     Today is hospital day 3d. This morning patient was seen and examined at bedside, resting comfortably in bed.    No acute or major events overnight.    Code Status:    Family communication:  Contact date:  Name of person contacted:  Relationship to patient:  Communication details:  What matters most:    PAST MEDICAL & SURGICAL HISTORY  Breast cancer    Hypertension    S/P hysterectomy      SOCIAL HISTORY:  Social History:      ALLERGIES:  No Known Allergies    MEDICATIONS:  STANDING MEDICATIONS  FIRST- Mouthwash  BLM 10 milliLiter(s) Swish and Spit every 6 hours  lactated ringers. 1000 milliLiter(s) IV Continuous <Continuous>  meropenem  IVPB 1000 milliGRAM(s) IV Intermittent every 8 hours  propranolol 60 milliGRAM(s) Oral daily  vancomycin  IVPB 1000 milliGRAM(s) IV Intermittent every 12 hours    PRN MEDICATIONS  acetaminophen     Tablet .. 650 milliGRAM(s) Oral every 6 hours PRN  bismuth subsalicylate Liquid 30 milliLiter(s) Oral every 8 hours PRN    VITALS:   T(F): 97.9  HR: 91  BP: 106/51  RR: 18  SpO2: 99%    PHYSICAL EXAM:  GENERAL:   (  ) NAD, lying in bed comfortably     (  ) obtunded     (  ) lethargic     (  ) somnolent    HEAD:   (  ) Atraumatic     (  ) hematoma     (  ) laceration (specify location:       )     NECK:  (  ) Supple     (  ) neck stiffness     (  ) nuchal rigidity     (  )  no JVD     (  ) JVD present ( -- cm)    HEART:  Rate -->     (  ) normal rate     (  ) bradycardic     (  ) tachycardic  Rhythm -->     (  ) regular     (  ) regularly irregular     (  ) irregularly irregular  Murmurs -->     (  ) normal s1s2     (  ) systolic murmur     (  ) diastolic murmur     (  ) continuous murmur      (  ) S3 present     (  ) S4 present    LUNGS:   (  )Unlabored respirations     (  ) tachypnea  (  ) B/L air entry     (  ) decreased breath sounds in:  (location     )    (  ) no adventitious sound     (  ) crackles     (  ) wheezing      (  ) rhonchi      (specify location:       )  (  ) chest wall tenderness (specify location:       )    ABDOMEN:   (  ) Soft     (  ) tense   |   (  ) nondistended     (  ) distended   |   (  ) +BS     (  ) hypoactive bowel sounds     (  ) hyperactive bowel sounds  (  ) nontender     (  ) RUQ tenderness     (  ) RLQ tenderness     (  ) LLQ tenderness     (  ) epigastric tenderness     (  ) diffuse tenderness  (  ) Splenomegaly      (  ) Hepatomegaly      (  ) Jaundice     (  ) ecchymosis     EXTREMITIES:  (  ) Normal     (  ) Rash     (  ) ecchymosis     (  ) varicose veins      (  ) pitting edema     (  ) non-pitting edema   (  ) ulceration     (  ) gangrene:     (location:     )    NERVOUS SYSTEM:    (  ) A&Ox3     (  ) confused     (  ) lethargic  CN II-XII:     (  ) Intact     (  ) deficits found     (Specify:     )   Upper extremities:     (  ) no sensorimotor deficits     (  ) weakness     (  ) loss of proprioception/vibration     (  ) loss of touch/temperature (specify:    )  Lower extremities:     (  ) no sensorimotor deficits     (  ) weakness     (  ) loss of proprioception/vibration     (  ) loss of touch/temperature (specify:    )    SKIN:   (  ) No rashes or lesions     (  ) maculopapular rash     (  ) pustules     (  ) vesicles     (  ) ulcer     (  ) ecchymosis     (specify location:     )    AMPAC score:    (  ) Indwelling Perez Catheter:   Date insterted:    Reason (  ) Critical illness     (  ) urinary retention    (  ) Accurate Ins/Outs Monitoring     (  ) CMO patient    (  ) Central Line:   Date inserted:  Location: (  ) Right IJ     (  ) Left IJ     (  ) Right Fem     (  ) Left Fem    (  ) SPC        (  ) pigtail       (  ) PEG tube       (  ) colostomy       (  ) jejunostomy  (  ) U-Dall    LABS:                        7.6    1.27  )-----------( 29       ( 07 Oct 2023 07:49 )             22.9     10-07    135  |  101  |  <3<L>  ----------------------------<  139<H>  3.5   |  24  |  0.6<L>    Ca    8.5      07 Oct 2023 07:49  Mg     1.6     10-07    TPro  5.1<L>  /  Alb  3.4<L>  /  TBili  <0.2  /  DBili  x   /  AST  15  /  ALT  14  /  AlkPhos  51  10-07      Urinalysis Basic - ( 07 Oct 2023 07:49 )    Color: x / Appearance: x / SG: x / pH: x  Gluc: 139 mg/dL / Ketone: x  / Bili: x / Urobili: x   Blood: x / Protein: x / Nitrite: x   Leuk Esterase: x / RBC: x / WBC x   Sq Epi: x / Non Sq Epi: x / Bacteria: x            Culture - Urine (collected 04 Oct 2023 20:48)  Source: Clean Catch Clean Catch (Midstream)  Final Report (06 Oct 2023 14:27):    <10,000 CFU/mL Normal Urogenital Lyla    Culture - Blood (collected 04 Oct 2023 19:23)  Source: .Blood Blood-Peripheral  Preliminary Report (07 Oct 2023 02:02):    No growth at 48 Hours    Culture - Blood (collected 04 Oct 2023 19:23)  Source: .Blood Blood-Peripheral  Preliminary Report (07 Oct 2023 02:02):    No growth at 48 Hours          RADIOLOGY:           24H events:    Patient is a 48y old Female who presents with a chief complaint of Fever (06 Oct 2023 12:00)    Primary diagnosis of Neutropenic fever        Today is hospital day 3d. This morning patient was seen and examined at bedside, resting comfortably in bed.    Patient reported feeling a little weak      Family communication:  Contact date:  Name of person contacted:  Relationship to patient:  Communication details:  What matters most:    PAST MEDICAL & SURGICAL HISTORY  Breast cancer    Hypertension    S/P hysterectomy      SOCIAL HISTORY:  Social History:      ALLERGIES:  No Known Allergies    MEDICATIONS:  STANDING MEDICATIONS  FIRST- Mouthwash  BLM 10 milliLiter(s) Swish and Spit every 6 hours  lactated ringers. 1000 milliLiter(s) IV Continuous <Continuous>  meropenem  IVPB 1000 milliGRAM(s) IV Intermittent every 8 hours  propranolol 60 milliGRAM(s) Oral daily  vancomycin  IVPB 1000 milliGRAM(s) IV Intermittent every 12 hours    PRN MEDICATIONS  acetaminophen     Tablet .. 650 milliGRAM(s) Oral every 6 hours PRN  bismuth subsalicylate Liquid 30 milliLiter(s) Oral every 8 hours PRN    VITALS:   T(F): 97.9  HR: 91  BP: 106/51  RR: 18  SpO2: 99%    PHYSICAL EXAM:  GENERAL:   (x  ) NAD, lying in bed comfortably     (  ) obtunded     (  ) lethargic     (  ) somnolent    HEAD:   (  ) Atraumatic     (  ) hematoma     (  ) laceration (specify location:       )     NECK:  (  ) Supple     (  ) neck stiffness     (  ) nuchal rigidity     (  )  no JVD     (  ) JVD present ( -- cm)    HEART:  Rate -->     (  ) normal rate     (  ) bradycardic     (  ) tachycardic  Rhythm -->     (  ) regular     (  ) regularly irregular     (  ) irregularly irregular  Murmurs -->     (  ) normal s1s2     (  ) systolic murmur     (  ) diastolic murmur     (  ) continuous murmur      (  ) S3 present     (  ) S4 present    LUNGS:   (  )Unlabored respirations     (  ) tachypnea  (  ) B/L air entry     (  ) decreased breath sounds in:  (location     )    (  ) no adventitious sound     (  ) crackles     (  ) wheezing      (  ) rhonchi      (specify location:       )  (  ) chest wall tenderness (specify location:       )    ABDOMEN:   (  ) Soft     (  ) tense   |   (  ) nondistended     (  ) distended   |   (  ) +BS     (  ) hypoactive bowel sounds     (  ) hyperactive bowel sounds  (  ) nontender     (  ) RUQ tenderness     (  ) RLQ tenderness     (  ) LLQ tenderness     (  ) epigastric tenderness     (  ) diffuse tenderness  (  ) Splenomegaly      (  ) Hepatomegaly      (  ) Jaundice     (  ) ecchymosis     EXTREMITIES:  (  ) Normal     (  ) Rash     (  ) ecchymosis     (  ) varicose veins      (  ) pitting edema     (  ) non-pitting edema   (  ) ulceration     (  ) gangrene:     (location:     )    NERVOUS SYSTEM:    (  ) A&Ox3     (  ) confused     (  ) lethargic  CN II-XII:     (  ) Intact     (  ) deficits found     (Specify:     )   Upper extremities:     (  ) no sensorimotor deficits     (  ) weakness     (  ) loss of proprioception/vibration     (  ) loss of touch/temperature (specify:    )  Lower extremities:     (  ) no sensorimotor deficits     (  ) weakness     (  ) loss of proprioception/vibration     (  ) loss of touch/temperature (specify:    )    SKIN:   (  ) No rashes or lesions     (  ) maculopapular rash     (  ) pustules     (  ) vesicles     (  ) ulcer     (  ) ecchymosis     (specify location:     )    AMPAC score:    (  ) Indwelling Perez Catheter:   Date insterted:    Reason (  ) Critical illness     (  ) urinary retention    (  ) Accurate Ins/Outs Monitoring     (  ) CMO patient    (  ) Central Line:   Date inserted:  Location: (  ) Right IJ     (  ) Left IJ     (  ) Right Fem     (  ) Left Fem    (  ) SPC        (  ) pigtail       (  ) PEG tube       (  ) colostomy       (  ) jejunostomy  (  ) U-Dall    LABS:                        7.6    1.27  )-----------( 29       ( 07 Oct 2023 07:49 )             22.9     10-07    135  |  101  |  <3<L>  ----------------------------<  139<H>  3.5   |  24  |  0.6<L>    Ca    8.5      07 Oct 2023 07:49  Mg     1.6     10-07    TPro  5.1<L>  /  Alb  3.4<L>  /  TBili  <0.2  /  DBili  x   /  AST  15  /  ALT  14  /  AlkPhos  51  10-07      Urinalysis Basic - ( 07 Oct 2023 07:49 )    Color: x / Appearance: x / SG: x / pH: x  Gluc: 139 mg/dL / Ketone: x  / Bili: x / Urobili: x   Blood: x / Protein: x / Nitrite: x   Leuk Esterase: x / RBC: x / WBC x   Sq Epi: x / Non Sq Epi: x / Bacteria: x            Culture - Urine (collected 04 Oct 2023 20:48)  Source: Clean Catch Clean Catch (Midstream)  Final Report (06 Oct 2023 14:27):    <10,000 CFU/mL Normal Urogenital Lyla    Culture - Blood (collected 04 Oct 2023 19:23)  Source: .Blood Blood-Peripheral  Preliminary Report (07 Oct 2023 02:02):    No growth at 48 Hours    Culture - Blood (collected 04 Oct 2023 19:23)  Source: .Blood Blood-Peripheral  Preliminary Report (07 Oct 2023 02:02):    No growth at 48 Hours          RADIOLOGY:

## 2023-10-07 NOTE — PROGRESS NOTE ADULT - ASSESSMENT
49 y/o F with PMH stage III breast CA on chemo, finished last session on 9/27, hx hysterectomy presenting for body aches, chills, fatigue, runny nose. Found to have neutropenic fever.    1, Neutropenic fever   . hx of Breast Ca stage 3, on chemotherapy (following with Dr. Adams at [facility])  * last chemo on 9/27/2023, febrile since 10/4, tachy, wbc 1100, ANC 20   - Admit to medicine                                           - CXR:  WNL                           - LA: 1.1                 - Urine analysis: nl   - Cont vancomycin and cefepime  - Blood cx:NTD                 - Fungitel: pending   - Pt received 2200ml bolus in the ER. Cont IVF at lower dose 100ml/hr   - Procalcitonin:pending   - Heme onc consult appreciated   - ID consult appreciated   - Critical care consult appreciated     2. Macrocytic anemia likely secondary to chemotherapy  - Vit b12: nl                  - Folate:nl     3. hx of migraine   - Cont propanolol     4. Thrombocytopenia might due to chemo  *  Need records to compare. pt reported her thrombocytopenia is usually around 90K  - Hold anticoagulation. SCD     MISC:  DVT ppx: scds  Diet: Regular  Activity: IAT  GI ppx: none.      47 y/o F with PMH stage III breast CA on chemo, finished last session on 9/27, hx hysterectomy presenting for body aches, chills, fatigue, runny nose. Found to have neutropenic fever.    1, Neutropenic fever   . hx of Breast Ca stage 3, on chemotherapy (following with Dr. Adams at [facility])  * last chemo on 9/27/2023, febrile since 10/4, tachy, wbc 1100, ANC 20   - Admit to medicine                                           - CXR:  WNL                           - LA: 1.1                 - Urine analysis: nl   - Cont vancomycin, started meropenem  - Blood cx:NTD                 - Fungitel: pending   - Pt received 2200ml bolus in the ER. Cont IVF at lower dose 100ml/hr   - Procalcitonin:pending   -RVP pending  -d-dimer  - Heme onc consult appreciated   - ID consult appreciated   - Critical care consult appreciated   -for nausea/vomiting, started tigan, monitor qtc    2. Macrocytic anemia likely secondary to chemotherapy  - Vit b12: nl                  - Folate:nl     3. hx of migraine   - Cont propanolol     4. Thrombocytopenia might due to chemo  *  Need records to compare. pt reported her thrombocytopenia is usually around 90K  - Hold anticoagulation. SCD     MISC:  DVT ppx: scds  Diet: Regular  Activity: IAT  GI ppx: none.      49 y/o F with PMH stage III breast CA on chemo, finished last session on 9/27, hx hysterectomy presenting for body aches, chills, fatigue, runny nose. Found to have neutropenic fever.    1, Neutropenic fever   . hx of Breast Ca stage 3, on chemotherapy (following with Dr. Adams at [facility])  * last chemo on 9/27/2023, febrile since 10/4, tachy, wbc 1100, ANC 20   - Admit to medicine                                           - CXR:  WNL                           - LA: 1.1                 - Urine analysis: nl   - Cont vancomycin, started meropenem  - Blood cx:NTD                 - Fungitel: pending   - Pt received 2200ml bolus in the ER. Cont IVF at lower dose 100ml/hr   - Procalcitonin:pending   -RVP pending  -d-dimer  - Heme onc consult appreciated   - ID consult appreciated   - Critical care consult appreciated   -for nausea/vomiting, started tigan, monitor qtc  -f/u d dimer    2. Macrocytic anemia likely secondary to chemotherapy  - Vit b12: nl                  - Folate:nl     3. hx of migraine   - Cont propanolol     4. Thrombocytopenia might due to chemo  *  Need records to compare. pt reported her thrombocytopenia is usually around 90K  - Hold anticoagulation. SCD     MISC:  DVT ppx: scds  Diet: Regular  Activity: IAT  GI ppx: none.

## 2023-10-07 NOTE — DIETITIAN INITIAL EVALUATION ADULT - ORAL INTAKE PTA/DIET HISTORY
The patient reports following a regular diet at home; consumed three meals at >75%; denies MVI use. The patient states they are hungry but has poor PO intake during current hospitalization secondary nausea. C/o swallowing difficulty secondary to mucositis due to chemotherapy; agreed to receive soft textured food items. NKFA.

## 2023-10-07 NOTE — DIETITIAN INITIAL EVALUATION ADULT - OTHER CALCULATIONS
Estimated Calorie Needs: [id number] x AF 1.1-1.7=[id number]/day -Due to obesity  Estimated Protein Needs: 98-113grams/day (1.3-1.5grams/kg of admit weight) -Due to cancer  Estimated Fluid Needs: 1470-1603mL/day (1mL/kcal)

## 2023-10-07 NOTE — PROGRESS NOTE ADULT - ASSESSMENT
47 y/o F with PMH stage III breast CA on chemo, finished last session on 9/27, hx hysterectomy presenting for body aches, chills, fatigue, runny nose. Found to have neutropenic fever.    #Cryptogenic SIRS with absolute neutropenia  #Neutropenic fever  #Stage III breast cancer   -febrile again overnight to 101.4F  -No obvious focus of infection  -Clinically stable  -CXR:  WNL  - LA: 1.1                   -Urine analysis: nl    -BCX NGTD  -UCx negative  -c/w Vancomycin 1 gm iv q12h   -spoke with ID, change cefepime to meropenem 1g iv q8h  -Pt received Neulasta on 9/27  -ANC up to 620 today, much improved  -send full RVP     #Mucositis  #Nausea   -patient still not eating much due to pain and nausea; c/w IVF   -zofran prn  -magic mouthwash q6h prn     #hx of migraine   - Cont propanolol     #Macrocytic anemia and thrombocytopenia- likely from chemo/possible viral infection? patient's son recently with febrile illness   -*Need records to compare. pt reported her thrombocytopenia is usually around 90K  - Hold anticoagulation. SCD    -WBC improving; Hb stable; platelets slowly trending up    -b12 and folate wnl      MISC:  DVT ppx: scds  Diet: Regular  Activity: IAT  GI ppx: none.     #Progress Note Handoff  Pending (specify):  improvement in ANC, resolution of fevers, patient needs to eat more, monitor platelets, full RVP  Family discussion: Patient well-informed and engaged in their care. In agreement.  Disposition: Acute  High risk given above acuity and comorbidities.   49 y/o F with PMH stage III breast CA on chemo, finished last session on 9/27, hx hysterectomy presenting for body aches, chills, fatigue, runny nose. Found to have neutropenic fever.    #Cryptogenic SIRS with absolute neutropenia  #Neutropenic fever  #Stage III breast cancer   -febrile again overnight to 101.4F  -No obvious focus of infection  -Clinically stable  -CXR:  WNL  - LA: 1.1                   -Urine analysis: nl    -BCX NGTD  -UCx negative  -c/w Vancomycin 1 gm iv q12h   -spoke with ID, change cefepime to meropenem 1g iv q8h  -Pt received Neulasta on 9/27  -ANC up to 620 today, much improved  -send full RVP  -send procalcitonin  -fungitell negative      #Mucositis  #Nausea   -patient still not eating much due to pain and nausea; c/w IVF   -zofran prn  -magic mouthwash q6h prn     #hx of migraine   - Cont propanolol     #Macrocytic anemia and thrombocytopenia- likely from chemo/possible viral infection? patient's son recently with febrile illness   -*Need records to compare. pt reported her thrombocytopenia is usually around 90K  - Hold anticoagulation. SCD    -WBC improving; Hb stable; platelets slowly trending up    -b12 and folate wnl      MISC:  DVT ppx: scds  Diet: Regular  Activity: IAT  GI ppx: none.     #Progress Note Handoff  Pending (specify):  improvement in ANC, resolution of fevers, patient needs to eat more, monitor platelets, full RVP  Family discussion: Patient well-informed and engaged in their care. In agreement.  Disposition: Acute  High risk given above acuity and comorbidities.

## 2023-10-07 NOTE — PATIENT PROFILE ADULT - FALL HARM RISK - HARM RISK INTERVENTIONS

## 2023-10-08 LAB
ALBUMIN SERPL ELPH-MCNC: 3 G/DL — LOW (ref 3.5–5.2)
ALP SERPL-CCNC: 50 U/L — SIGNIFICANT CHANGE UP (ref 30–115)
ALT FLD-CCNC: 13 U/L — SIGNIFICANT CHANGE UP (ref 0–41)
ANION GAP SERPL CALC-SCNC: 10 MMOL/L — SIGNIFICANT CHANGE UP (ref 7–14)
AST SERPL-CCNC: 14 U/L — SIGNIFICANT CHANGE UP (ref 0–41)
BASOPHILS # BLD AUTO: 0.03 K/UL — SIGNIFICANT CHANGE UP (ref 0–0.2)
BASOPHILS NFR BLD AUTO: 2.3 % — HIGH (ref 0–1)
BILIRUB SERPL-MCNC: <0.2 MG/DL — SIGNIFICANT CHANGE UP (ref 0.2–1.2)
BUN SERPL-MCNC: <3 MG/DL — LOW (ref 10–20)
CALCIUM SERPL-MCNC: 8.5 MG/DL — SIGNIFICANT CHANGE UP (ref 8.4–10.4)
CHLORIDE SERPL-SCNC: 104 MMOL/L — SIGNIFICANT CHANGE UP (ref 98–110)
CO2 SERPL-SCNC: 26 MMOL/L — SIGNIFICANT CHANGE UP (ref 17–32)
CREAT SERPL-MCNC: 0.5 MG/DL — LOW (ref 0.7–1.5)
EGFR: 116 ML/MIN/1.73M2 — SIGNIFICANT CHANGE UP
EOSINOPHIL # BLD AUTO: 0 K/UL — SIGNIFICANT CHANGE UP (ref 0–0.7)
EOSINOPHIL NFR BLD AUTO: 0 % — SIGNIFICANT CHANGE UP (ref 0–8)
GLUCOSE SERPL-MCNC: 95 MG/DL — SIGNIFICANT CHANGE UP (ref 70–99)
HCT VFR BLD CALC: 23.1 % — LOW (ref 37–47)
HGB BLD-MCNC: 7.7 G/DL — LOW (ref 12–16)
IMM GRANULOCYTES NFR BLD AUTO: 0.8 % — HIGH (ref 0.1–0.3)
LYMPHOCYTES # BLD AUTO: 0.55 K/UL — LOW (ref 1.2–3.4)
LYMPHOCYTES # BLD AUTO: 42.6 % — SIGNIFICANT CHANGE UP (ref 20.5–51.1)
MAGNESIUM SERPL-MCNC: 1.9 MG/DL — SIGNIFICANT CHANGE UP (ref 1.8–2.4)
MCHC RBC-ENTMCNC: 33.3 G/DL — SIGNIFICANT CHANGE UP (ref 32–37)
MCHC RBC-ENTMCNC: 33.5 PG — HIGH (ref 27–31)
MCV RBC AUTO: 100.4 FL — HIGH (ref 81–99)
MONOCYTES # BLD AUTO: 0.13 K/UL — SIGNIFICANT CHANGE UP (ref 0.1–0.6)
MONOCYTES NFR BLD AUTO: 10.1 % — HIGH (ref 1.7–9.3)
NEUTROPHILS # BLD AUTO: 0.57 K/UL — LOW (ref 1.4–6.5)
NEUTROPHILS NFR BLD AUTO: 44.2 % — SIGNIFICANT CHANGE UP (ref 42.2–75.2)
NRBC # BLD: 0 /100 WBCS — SIGNIFICANT CHANGE UP (ref 0–0)
PLATELET # BLD AUTO: 48 K/UL — LOW (ref 130–400)
PMV BLD: 12.5 FL — HIGH (ref 7.4–10.4)
POTASSIUM SERPL-MCNC: 3.6 MMOL/L — SIGNIFICANT CHANGE UP (ref 3.5–5)
POTASSIUM SERPL-SCNC: 3.6 MMOL/L — SIGNIFICANT CHANGE UP (ref 3.5–5)
PROCALCITONIN SERPL-MCNC: 0.32 NG/ML — HIGH (ref 0.02–0.1)
PROT SERPL-MCNC: 5.2 G/DL — LOW (ref 6–8)
RBC # BLD: 2.3 M/UL — LOW (ref 4.2–5.4)
RBC # FLD: 12.6 % — SIGNIFICANT CHANGE UP (ref 11.5–14.5)
SODIUM SERPL-SCNC: 140 MMOL/L — SIGNIFICANT CHANGE UP (ref 135–146)
VANCOMYCIN TROUGH SERPL-MCNC: 6.2 UG/ML — SIGNIFICANT CHANGE UP (ref 5–10)
WBC # BLD: 1.29 K/UL — LOW (ref 4.8–10.8)
WBC # FLD AUTO: 1.29 K/UL — LOW (ref 4.8–10.8)

## 2023-10-08 PROCEDURE — 93010 ELECTROCARDIOGRAM REPORT: CPT

## 2023-10-08 PROCEDURE — 99233 SBSQ HOSP IP/OBS HIGH 50: CPT

## 2023-10-08 PROCEDURE — 74176 CT ABD & PELVIS W/O CONTRAST: CPT | Mod: 26

## 2023-10-08 RX ORDER — IOHEXOL 300 MG/ML
30 INJECTION, SOLUTION INTRAVENOUS ONCE
Refills: 0 | Status: COMPLETED | OUTPATIENT
Start: 2023-10-08 | End: 2023-10-08

## 2023-10-08 RX ORDER — VANCOMYCIN HCL 1 G
1250 VIAL (EA) INTRAVENOUS EVERY 12 HOURS
Refills: 0 | Status: DISCONTINUED | OUTPATIENT
Start: 2023-10-08 | End: 2023-10-09

## 2023-10-08 RX ADMIN — MEROPENEM 100 MILLIGRAM(S): 1 INJECTION INTRAVENOUS at 21:17

## 2023-10-08 RX ADMIN — MEROPENEM 100 MILLIGRAM(S): 1 INJECTION INTRAVENOUS at 13:44

## 2023-10-08 RX ADMIN — Medication 250 MILLIGRAM(S): at 12:15

## 2023-10-08 RX ADMIN — Medication 166.67 MILLIGRAM(S): at 21:17

## 2023-10-08 RX ADMIN — IOHEXOL 30 MILLILITER(S): 300 INJECTION, SOLUTION INTRAVENOUS at 12:57

## 2023-10-08 RX ADMIN — MEROPENEM 100 MILLIGRAM(S): 1 INJECTION INTRAVENOUS at 05:37

## 2023-10-08 RX ADMIN — SODIUM CHLORIDE 100 MILLILITER(S): 9 INJECTION, SOLUTION INTRAVENOUS at 05:37

## 2023-10-08 RX ADMIN — DIPHENHYDRAMINE HYDROCHLORIDE AND LIDOCAINE HYDROCHLORIDE AND ALUMINUM HYDROXIDE AND MAGNESIUM HYDRO 10 MILLILITER(S): KIT at 12:16

## 2023-10-08 RX ADMIN — Medication 200 MILLIGRAM(S): at 14:19

## 2023-10-08 RX ADMIN — DIPHENHYDRAMINE HYDROCHLORIDE AND LIDOCAINE HYDROCHLORIDE AND ALUMINUM HYDROXIDE AND MAGNESIUM HYDRO 10 MILLILITER(S): KIT at 23:04

## 2023-10-08 RX ADMIN — SODIUM CHLORIDE 100 MILLILITER(S): 9 INJECTION, SOLUTION INTRAVENOUS at 17:16

## 2023-10-08 RX ADMIN — DIPHENHYDRAMINE HYDROCHLORIDE AND LIDOCAINE HYDROCHLORIDE AND ALUMINUM HYDROXIDE AND MAGNESIUM HYDRO 10 MILLILITER(S): KIT at 17:15

## 2023-10-08 NOTE — PROGRESS NOTE ADULT - SUBJECTIVE AND OBJECTIVE BOX
TAL MANCILLA  [facility] [id number] A ([facility])      Patient is a 48y old  Female who presents with a chief complaint of Neutropenia     (07 Oct 2023 22:49)        Interval events:  Patient seen and examined at bedside. No acute events overnight. No fevers since 2 nights ago. Had 2 normal BM yesterday and 1 loose stool this AM. Says she still cannot eat due to feeling extremely nauseous. Still has pain in mouth.     -PMHx: Breast cancer    Hypertension      -PSHx:S/P hysterectomy            REVIEW OF SYSTEMS:  CONSTITUTIONAL: No fever, weight loss, or fatigue  RESPIRATORY: No cough, wheezing, chills or hemoptysis; No shortness of breath  CARDIOVASCULAR: No chest pain, palpitations, dizziness, or leg swelling  GASTROINTESTINAL: as above   NEUROLOGICAL: No headaches  LYMPH NODES: No enlarged glands  MUSCULOSKELETAL: No joint pain or swelling; No muscle, back, or extremity pain      T(C): , Max: 37.6 (10-08-23 @ 05:48)  HR: 86 (10-08-23 @ 12:53)  BP: 109/63 (10-08-23 @ 12:53)  RR: 18 (10-08-23 @ 12:53)  SpO2: 95% (10-08-23 @ 05:48)   CAPILLARY BLOOD GLUCOSE        PHYSICAL EXAM:  GENERAL: NAD, lying in bed comfortably  HEAD:  Atraumatic, Normocephalic  EYES: EOMI, PERRLA, conjunctiva and sclera clear  ENT: +mucositis; no thrush   NECK: Supple, No JVD  CHEST/LUNG: Clear to auscultation bilaterally; No rales, rhonchi, wheezing, or rubs. Unlabored respirations; Medoport right SVC area no pain/erythema   HEART: Regular rate and rhythm; No murmurs, rubs, or gallops  ABDOMEN: Bowel sounds present; Soft, Nontender, Nondistended. No hepatomegaly  EXTREMITIES:  2+ Peripheral Pulses, brisk capillary refill. No clubbing, cyanosis, or edema  NERVOUS SYSTEM:  Alert & Oriented X3, speech clear. No deficits   MSK: FROM all 4 extremities, full and equal strength  SKIN: No rashes or lesions    Consultant(s) Notes Reviewed:  [x ] YES  [ ] NO  Care Discussed with Consultants/Other Providers [ x] YES  [ ] NO      LABS:          7.7  1.29  )-------(48          23.1  N=44.2  L=42.6  NCI=031.4    140|104|<3<L>  ------------------<95  3.6|26|0.5<L>  eGFR:--  Ca:8.5            Microbiology:    Culture - Urine (collected 10-04-23 @ 20:48)  Source: Clean Catch Clean Catch (Midstream)  Final Report (10-06-23 @ 14:27):    <10,000 CFU/mL Normal Urogenital Lyla    Culture - Blood (collected 10-04-23 @ 19:23)  Source: .Blood Blood-Peripheral  Preliminary Report (10-08-23 @ 02:01):    No growth at 72 Hours    Culture - Blood (collected 10-04-23 @ 19:23)  Source: .Blood Blood-Peripheral  Preliminary Report (10-08-23 @ 02:01):    No growth at 72 Hours        RADIOLOGY & ADDITIONAL TESTS:  < from: Xray Chest 1 View- PORTABLE-Urgent (10.04.23 @ 20:16) >  Impression:    No radiographic evidence of acute cardiopulmonary disease.    < end of copied text >        Medications:  acetaminophen     Tablet .. 650 milliGRAM(s) Oral every 6 hours PRN  bismuth subsalicylate Liquid 30 milliLiter(s) Oral every 8 hours PRN  FIRST- Mouthwash  BLM 10 milliLiter(s) Swish and Spit every 6 hours  lactated ringers. 1000 milliLiter(s) IV Continuous <Continuous>  meropenem  IVPB 1000 milliGRAM(s) IV Intermittent every 8 hours  propranolol 60 milliGRAM(s) Oral daily  trimethobenzamide Injectable 200 milliGRAM(s) IntraMuscular every 12 hours PRN  vancomycin  IVPB 1250 milliGRAM(s) IV Intermittent every 12 hours

## 2023-10-08 NOTE — PROGRESS NOTE ADULT - ASSESSMENT
49 y/o F with PMH stage III breast CA on chemo, finished last session on 9/27, hx hysterectomy presenting for body aches, chills, fatigue, runny nose. Found to have neutropenic fever.    #Cryptogenic SIRS with absolute neutropenia  #Neutropenic fever  #Stage III breast cancer   -no fevers since 10/7   -No obvious focus of infection  -Clinically stable  -CXR:  WNL  - LA: 1.1                   -Urine analysis: nl     -BCX NGTD  -UCx negative  -inc Vancomycin 1.25 gm iv q12h    -c/w meropenem 1g iv q8h  -Pt received Neulasta on 9/27  - today, much improved  -send full RVP - still pending   -procalcitonin 0.32   -fungitell negative      #Mucositis  #Nausea   #Prolonged QTC   -patient still not eating much due to pain and nausea; c/w IVF   -caution with anti-emetics; qtc 498 today; daily ekg  -can do Tigan 200mg IM BID; monitor for EPS     -c/w magic mouthwash q6h prn; if needed can add morphine prior to eating   -will get CT abd/pelvis with oral contrast to see why patient still nauseous even after many anti-emetics     #hx of migraine   - Cont propanolol     #Macrocytic anemia and thrombocytopenia- likely from chemo/possible viral infection? patient's son recently with febrile illness   -*Need records to compare. pt reported her thrombocytopenia is usually around 90K  - Hold anticoagulation. SCD    -WBC improving; Hb stable; platelets slowly trending up    -b12 and folate wnl      MISC:  DVT ppx: scds  Diet: Regular  Activity: IAT  GI ppx: none.     #Progress Note Handoff  Pending (specify):  improvement in ANC, resolution of fevers, patient needs to eat more, CT abd/pelvis, monitor platelets, full RVP  Family discussion: Patient well-informed and engaged in their care. In agreement.  Disposition: Acute   High risk given above acuity and comorbidities.

## 2023-10-08 NOTE — CHART NOTE - NSCHARTNOTEFT_GEN_A_CORE
vanc trough came back at 6.2. Spoke with pharmacy that recommended increasing dose from 1g to 1.25g. Vanc dose order  changed to 1.25g

## 2023-10-09 LAB
ALBUMIN SERPL ELPH-MCNC: 3.5 G/DL — SIGNIFICANT CHANGE UP (ref 3.5–5.2)
ALP SERPL-CCNC: 47 U/L — SIGNIFICANT CHANGE UP (ref 30–115)
ALT FLD-CCNC: 14 U/L — SIGNIFICANT CHANGE UP (ref 0–41)
ANION GAP SERPL CALC-SCNC: 9 MMOL/L — SIGNIFICANT CHANGE UP (ref 7–14)
AST SERPL-CCNC: 19 U/L — SIGNIFICANT CHANGE UP (ref 0–41)
BASOPHILS # BLD AUTO: 0.04 K/UL — SIGNIFICANT CHANGE UP (ref 0–0.2)
BASOPHILS NFR BLD AUTO: 2.4 % — HIGH (ref 0–1)
BILIRUB SERPL-MCNC: 0.2 MG/DL — SIGNIFICANT CHANGE UP (ref 0.2–1.2)
BLD GP AB SCN SERPL QL: SIGNIFICANT CHANGE UP
BUN SERPL-MCNC: <3 MG/DL — LOW (ref 10–20)
CALCIUM SERPL-MCNC: 8.7 MG/DL — SIGNIFICANT CHANGE UP (ref 8.4–10.5)
CHLORIDE SERPL-SCNC: 103 MMOL/L — SIGNIFICANT CHANGE UP (ref 98–110)
CO2 SERPL-SCNC: 26 MMOL/L — SIGNIFICANT CHANGE UP (ref 17–32)
CREAT SERPL-MCNC: <0.5 MG/DL — LOW (ref 0.7–1.5)
EGFR: 122 ML/MIN/1.73M2 — SIGNIFICANT CHANGE UP
EOSINOPHIL # BLD AUTO: 0 K/UL — SIGNIFICANT CHANGE UP (ref 0–0.7)
EOSINOPHIL NFR BLD AUTO: 0 % — SIGNIFICANT CHANGE UP (ref 0–8)
GLUCOSE SERPL-MCNC: 96 MG/DL — SIGNIFICANT CHANGE UP (ref 70–99)
HCT VFR BLD CALC: 23.4 % — LOW (ref 37–47)
HGB BLD-MCNC: 8.1 G/DL — LOW (ref 12–16)
IMM GRANULOCYTES NFR BLD AUTO: 0.6 % — HIGH (ref 0.1–0.3)
LYMPHOCYTES # BLD AUTO: 0.55 K/UL — LOW (ref 1.2–3.4)
LYMPHOCYTES # BLD AUTO: 33.5 % — SIGNIFICANT CHANGE UP (ref 20.5–51.1)
MAGNESIUM SERPL-MCNC: 1.7 MG/DL — LOW (ref 1.8–2.4)
MCHC RBC-ENTMCNC: 34.6 G/DL — SIGNIFICANT CHANGE UP (ref 32–37)
MCHC RBC-ENTMCNC: 34.8 PG — HIGH (ref 27–31)
MCV RBC AUTO: 100.4 FL — HIGH (ref 81–99)
MONOCYTES # BLD AUTO: 0.21 K/UL — SIGNIFICANT CHANGE UP (ref 0.1–0.6)
MONOCYTES NFR BLD AUTO: 12.8 % — HIGH (ref 1.7–9.3)
NEUTROPHILS # BLD AUTO: 0.83 K/UL — LOW (ref 1.4–6.5)
NEUTROPHILS NFR BLD AUTO: 50.7 % — SIGNIFICANT CHANGE UP (ref 42.2–75.2)
NRBC # BLD: 0 /100 WBCS — SIGNIFICANT CHANGE UP (ref 0–0)
PLATELET # BLD AUTO: 75 K/UL — LOW (ref 130–400)
PMV BLD: 12 FL — HIGH (ref 7.4–10.4)
POTASSIUM SERPL-MCNC: 3.7 MMOL/L — SIGNIFICANT CHANGE UP (ref 3.5–5)
POTASSIUM SERPL-SCNC: 3.7 MMOL/L — SIGNIFICANT CHANGE UP (ref 3.5–5)
PROT SERPL-MCNC: 5.1 G/DL — LOW (ref 6–8)
RAPID RVP RESULT: SIGNIFICANT CHANGE UP
RBC # BLD: 2.33 M/UL — LOW (ref 4.2–5.4)
RBC # FLD: 12.7 % — SIGNIFICANT CHANGE UP (ref 11.5–14.5)
SARS-COV-2 RNA SPEC QL NAA+PROBE: SIGNIFICANT CHANGE UP
SODIUM SERPL-SCNC: 138 MMOL/L — SIGNIFICANT CHANGE UP (ref 135–146)
WBC # BLD: 1.64 K/UL — LOW (ref 4.8–10.8)
WBC # FLD AUTO: 1.64 K/UL — LOW (ref 4.8–10.8)

## 2023-10-09 PROCEDURE — 93010 ELECTROCARDIOGRAM REPORT: CPT

## 2023-10-09 PROCEDURE — 99233 SBSQ HOSP IP/OBS HIGH 50: CPT

## 2023-10-09 RX ORDER — MORPHINE SULFATE 50 MG/1
1 CAPSULE, EXTENDED RELEASE ORAL EVERY 6 HOURS
Refills: 0 | Status: DISCONTINUED | OUTPATIENT
Start: 2023-10-09 | End: 2023-10-10

## 2023-10-09 RX ORDER — CHLORHEXIDINE GLUCONATE 213 G/1000ML
1 SOLUTION TOPICAL
Refills: 0 | Status: DISCONTINUED | OUTPATIENT
Start: 2023-10-09 | End: 2023-10-10

## 2023-10-09 RX ORDER — MAGNESIUM SULFATE 500 MG/ML
2 VIAL (ML) INJECTION ONCE
Refills: 0 | Status: COMPLETED | OUTPATIENT
Start: 2023-10-09 | End: 2023-10-09

## 2023-10-09 RX ADMIN — MEROPENEM 100 MILLIGRAM(S): 1 INJECTION INTRAVENOUS at 21:49

## 2023-10-09 RX ADMIN — Medication 25 GRAM(S): at 17:26

## 2023-10-09 RX ADMIN — Medication 650 MILLIGRAM(S): at 01:07

## 2023-10-09 RX ADMIN — MEROPENEM 100 MILLIGRAM(S): 1 INJECTION INTRAVENOUS at 14:59

## 2023-10-09 RX ADMIN — MORPHINE SULFATE 1 MILLIGRAM(S): 50 CAPSULE, EXTENDED RELEASE ORAL at 17:39

## 2023-10-09 RX ADMIN — DIPHENHYDRAMINE HYDROCHLORIDE AND LIDOCAINE HYDROCHLORIDE AND ALUMINUM HYDROXIDE AND MAGNESIUM HYDRO 10 MILLILITER(S): KIT at 05:38

## 2023-10-09 RX ADMIN — MORPHINE SULFATE 1 MILLIGRAM(S): 50 CAPSULE, EXTENDED RELEASE ORAL at 17:21

## 2023-10-09 RX ADMIN — Medication 650 MILLIGRAM(S): at 00:37

## 2023-10-09 RX ADMIN — MEROPENEM 100 MILLIGRAM(S): 1 INJECTION INTRAVENOUS at 06:41

## 2023-10-09 RX ADMIN — DIPHENHYDRAMINE HYDROCHLORIDE AND LIDOCAINE HYDROCHLORIDE AND ALUMINUM HYDROXIDE AND MAGNESIUM HYDRO 10 MILLILITER(S): KIT at 13:06

## 2023-10-09 RX ADMIN — CHLORHEXIDINE GLUCONATE 1 APPLICATION(S): 213 SOLUTION TOPICAL at 17:27

## 2023-10-09 RX ADMIN — Medication 166.67 MILLIGRAM(S): at 09:59

## 2023-10-09 RX ADMIN — DIPHENHYDRAMINE HYDROCHLORIDE AND LIDOCAINE HYDROCHLORIDE AND ALUMINUM HYDROXIDE AND MAGNESIUM HYDRO 10 MILLILITER(S): KIT at 17:26

## 2023-10-09 RX ADMIN — SODIUM CHLORIDE 100 MILLILITER(S): 9 INJECTION, SOLUTION INTRAVENOUS at 05:34

## 2023-10-09 NOTE — PROGRESS NOTE ADULT - ASSESSMENT
47 y/o F with PMH stage III breast CA on chemo, finished last session on 9/27, hx hysterectomy presenting for body aches, chills, fatigue, runny nose. Found to have neutropenic fever.    #Cryptogenic SIRS with absolute neutropenia  #Neutropenic fever  #Stage III breast cancer   -no fevers since 10/7   -No obvious focus of infection  -Clinically stable  -CXR:  WNL  - LA: 1.1                   -Urine analysis: nl     -BCX NGTD  -UCx negative  -MRSA negative; d/c vancomycin   -c/w meropenem 1g iv q8h for now per ID   -Pt received Neulasta on 9/27  - today, much improved  -full RVP negative   -procalcitonin 0.32   -fungitell negative   -CT abdomen/pelvis negative      #Mucositis  #Nausea - improved today   #Prolonged QTC   -patient still not eating much due to pain and nausea; c/w IVF until eating more   -caution with anti-emetics; qtc 498 10/8; repeat EKG today   -can do Tigan 200mg IM BID; monitor for EPS     -c/w magic mouthwash q6h prn; add morphine prior to eating   -CT abd/pelvis no intraabdominal pathology that would explain nausea/vomiting    #Right lower lobe subpleural nodule 8mm  -f/u with o/p CT     #Hepatic steatosis  -diet/exercise counselling      #hx of migraine   - Cont propanolol     #Macrocytic anemia and thrombocytopenia- likely from chemo/possible viral infection? patient's son recently with febrile illness   -*Need records to compare. pt reported her thrombocytopenia is usually around 90K  - Hold anticoagulation. SCD    -WBC improving; Hb stable; platelets slowly trending up    -b12 and folate wnl      MISC:  DVT ppx: scds  Diet: Regular  Activity: IAT  GI ppx: none.     #Progress Note Handoff  Pending (specify):  improvement in ANC, resolution of fevers, patient needs to eat more, monitor platelets   Family discussion: Patient well-informed and engaged in their care. In agreement. Updated  Anders at bedside.   Disposition: Acute; hopefully d/c in next 24-48hrs   High risk given above acuity and comorbidities. 47 y/o F with PMH stage III breast CA on chemo, finished last session on 9/27, hx hysterectomy presenting for body aches, chills, fatigue, runny nose. Found to have neutropenic fever.    #Cryptogenic SIRS with absolute neutropenia  #Neutropenic fever  #Stage III breast cancer   -no fevers since 10/7   -No obvious focus of infection  -Clinically stable  -CXR:  WNL  - LA: 1.1                   -Urine analysis: nl     -BCX NGTD  -UCx negative  -MRSA negative; d/c vancomycin   -c/w meropenem 1g iv q8h for now per ID   -Pt received Neulasta on 9/27  - today, much improved  -full RVP negative   -procalcitonin 0.32   -fungitell negative   -CT abdomen/pelvis negative     #Diarrhea  -3 loose stool in last day despite not eating  -CT abdomen negative  -send c. diff, GI pcr, stool culture  -monitor for diarrhea      #Mucositis  #Nausea - improved today   #Prolonged QTC   -patient still not eating much due to pain and nausea; c/w IVF until eating more   -caution with anti-emetics; qtc 498 10/8; repeat EKG today   -can do Tigan 200mg IM BID; monitor for EPS     -c/w magic mouthwash q6h prn; add morphine prior to eating   -CT abd/pelvis no intraabdominal pathology that would explain nausea/vomiting    #Right lower lobe subpleural nodule 8mm  -f/u with o/p CT     #Hypomagnesemia  -replete prn     #Hepatic steatosis  -diet/exercise counselling      #hx of migraine   - Cont propanolol     #Macrocytic anemia and thrombocytopenia- likely from chemo/possible viral infection? patient's son recently with febrile illness   -*Need records to compare. pt reported her thrombocytopenia is usually around 90K  - Hold anticoagulation. SCD    -WBC improving; Hb stable; platelets slowly trending up    -b12 and folate wnl      MISC:  DVT ppx: scds  Diet: Regular  Activity: IAT  GI ppx: none.     #Progress Note Handoff  Pending (specify):  improvement in ANC, resolution of fevers, patient needs to eat more, monitor platelets   Family discussion: Patient well-informed and engaged in their care. In agreement. Updated  Anders at bedside.   Disposition: Acute; hopefully d/c in next 24-48hrs   High risk given above acuity and comorbidities.

## 2023-10-09 NOTE — PROGRESS NOTE ADULT - SUBJECTIVE AND OBJECTIVE BOX
TAL MANCILLA  [facility] 029 A ([facility])      Patient is a 48y old  Female who presents with a chief complaint of Fever (08 Oct 2023 13:29)        Interval events:  Patient seen and examined at bedside. No fevers again overnight. Patient has not vomited and feels less nauseous. Has had 3 episodes of loose stool in the past 24 hours though despite not eating much. Wants to eat, very hungry today, just has a lot of pain in her mouth still, offered her morphine which she accepted.     -PMHx: Breast cancer    Hypertension      -PSHx:S/P hysterectomy            REVIEW OF SYSTEMS:  CONSTITUTIONAL: No fever, weight loss, or fatigue  RESPIRATORY: No cough, wheezing, chills or hemoptysis; No shortness of breath  CARDIOVASCULAR: No chest pain, palpitations, dizziness, or leg swelling  GASTROINTESTINAL: as above   NEUROLOGICAL: No headaches  LYMPH NODES: No enlarged glands  MUSCULOSKELETAL: No joint pain or swelling; No muscle, back, or extremity pain      T(C): , Max: 36.6 (10-09-23 @ 05:31)  HR: 98 (10-09-23 @ 05:31)  BP: 116/60 (10-09-23 @ 05:31)  RR: 18 (10-09-23 @ 05:31)  SpO2: 96% (10-09-23 @ 05:31)  CAPILLARY BLOOD GLUCOSE              PHYSICAL EXAM:  GENERAL: NAD, lying in bed comfortably  HEAD:  Atraumatic, Normocephalic  EYES: EOMI, PERRLA, conjunctiva and sclera clear  ENT: +mucositis; no thrush   NECK: Supple, No JVD  CHEST/LUNG: Clear to auscultation bilaterally; No rales, rhonchi, wheezing, or rubs. Unlabored respirations; Medoport right SVC area no pain/erythema   HEART: Regular rate and rhythm; No murmurs, rubs, or gallops  ABDOMEN: Bowel sounds present; Soft, Nontender, Nondistended. No hepatomegaly  EXTREMITIES:  2+ Peripheral Pulses, brisk capillary refill. No clubbing, cyanosis, or edema  NERVOUS SYSTEM:  Alert & Oriented X3, speech clear. No deficits   MSK: FROM all 4 extremities, full and equal strength  SKIN: No rashes or lesions    Consultant(s) Notes Reviewed:  [x ] YES  [ ] NO  Care Discussed with Consultants/Other Providers [ x] YES  [ ] NO      LABS:          8.1  1.64  )-------(75          23.4  N=50.7  L=33.5  DYI=526.4    138|103|<3<L>  ------------------<96  3.7|26|<0.5<L>  eGFR:--  Ca:8.7            Microbiology:    Culture - Blood (collected 10-07-23 @ 12:32)  Source: .Blood None  Preliminary Report (10-08-23 @ 19:01):    No growth at 24 hours    Culture - Urine (collected 10-04-23 @ 20:48)  Source: Clean Catch Clean Catch (Midstream)  Final Report (10-06-23 @ 14:27):    <10,000 CFU/mL Normal Urogenital Lyla    Culture - Blood (collected 10-04-23 @ 19:23)  Source: .Blood Blood-Peripheral  Preliminary Report (10-09-23 @ 02:01):    No growth at 4 days    Culture - Blood (collected 10-04-23 @ 19:23)  Source: .Blood Blood-Peripheral  Preliminary Report (10-09-23 @ 02:01):    No growth at 4 days        RADIOLOGY & ADDITIONAL TESTS:  < from: CT Abdomen and Pelvis w/ Oral Cont (10.08.23 @ 17:10) >  IMPRESSION:    1. No definite evidence of acute/inflammatory process within the abdomen   or pelvis.  2. Trace bilateral pleural effusions and bibasilar compressive   atelectasis, right greater than left.  3. Partially imaged 8 mm right lower lobe subpleural nodule, which is   indeterminate.  4. Diffuse hepatic steatosis.    < end of copied text >        Medications:  acetaminophen     Tablet .. 650 milliGRAM(s) Oral every 6 hours PRN  bismuth subsalicylate Liquid 30 milliLiter(s) Oral every 8 hours PRN  chlorhexidine 2% Cloths 1 Application(s) Topical <User Schedule>  FIRST- Mouthwash  BLM 10 milliLiter(s) Swish and Spit every 6 hours  lactated ringers. 1000 milliLiter(s) IV Continuous <Continuous>  magnesium sulfate  IVPB 2 Gram(s) IV Intermittent once  meropenem  IVPB 1000 milliGRAM(s) IV Intermittent every 8 hours  morphine  - Injectable 1 milliGRAM(s) IV Push every 6 hours PRN  propranolol 60 milliGRAM(s) Oral daily  trimethobenzamide Injectable 200 milliGRAM(s) IntraMuscular every 12 hours PRN

## 2023-10-10 ENCOUNTER — TRANSCRIPTION ENCOUNTER (OUTPATIENT)
Age: 48
End: 2023-10-10

## 2023-10-10 VITALS
SYSTOLIC BLOOD PRESSURE: 119 MMHG | HEART RATE: 86 BPM | TEMPERATURE: 98 F | DIASTOLIC BLOOD PRESSURE: 72 MMHG | RESPIRATION RATE: 18 BRPM

## 2023-10-10 LAB
ALBUMIN SERPL ELPH-MCNC: 3.5 G/DL — SIGNIFICANT CHANGE UP (ref 3.5–5.2)
ALP SERPL-CCNC: 50 U/L — SIGNIFICANT CHANGE UP (ref 30–115)
ALT FLD-CCNC: 15 U/L — SIGNIFICANT CHANGE UP (ref 0–41)
ANION GAP SERPL CALC-SCNC: 11 MMOL/L — SIGNIFICANT CHANGE UP (ref 7–14)
APTT BLD: 35.4 SEC — SIGNIFICANT CHANGE UP (ref 27–39.2)
AST SERPL-CCNC: 24 U/L — SIGNIFICANT CHANGE UP (ref 0–41)
BASOPHILS # BLD AUTO: 0.03 K/UL — SIGNIFICANT CHANGE UP (ref 0–0.2)
BASOPHILS NFR BLD AUTO: 1.6 % — HIGH (ref 0–1)
BILIRUB SERPL-MCNC: 0.3 MG/DL — SIGNIFICANT CHANGE UP (ref 0.2–1.2)
BUN SERPL-MCNC: <3 MG/DL — LOW (ref 10–20)
CALCIUM SERPL-MCNC: 8.6 MG/DL — SIGNIFICANT CHANGE UP (ref 8.4–10.5)
CHLORIDE SERPL-SCNC: 100 MMOL/L — SIGNIFICANT CHANGE UP (ref 98–110)
CO2 SERPL-SCNC: 25 MMOL/L — SIGNIFICANT CHANGE UP (ref 17–32)
CREAT SERPL-MCNC: <0.5 MG/DL — LOW (ref 0.7–1.5)
CULTURE RESULTS: SIGNIFICANT CHANGE UP
CULTURE RESULTS: SIGNIFICANT CHANGE UP
EGFR: 122 ML/MIN/1.73M2 — SIGNIFICANT CHANGE UP
EOSINOPHIL # BLD AUTO: 0 K/UL — SIGNIFICANT CHANGE UP (ref 0–0.7)
EOSINOPHIL NFR BLD AUTO: 0 % — SIGNIFICANT CHANGE UP (ref 0–8)
GLUCOSE SERPL-MCNC: 94 MG/DL — SIGNIFICANT CHANGE UP (ref 70–99)
HCT VFR BLD CALC: 25.3 % — LOW (ref 37–47)
HGB BLD-MCNC: 8.5 G/DL — LOW (ref 12–16)
IMM GRANULOCYTES NFR BLD AUTO: 0.5 % — HIGH (ref 0.1–0.3)
INR BLD: 1.13 RATIO — SIGNIFICANT CHANGE UP (ref 0.65–1.3)
LYMPHOCYTES # BLD AUTO: 0.66 K/UL — LOW (ref 1.2–3.4)
LYMPHOCYTES # BLD AUTO: 34.2 % — SIGNIFICANT CHANGE UP (ref 20.5–51.1)
MAGNESIUM SERPL-MCNC: 1.8 MG/DL — SIGNIFICANT CHANGE UP (ref 1.8–2.4)
MCHC RBC-ENTMCNC: 33.6 G/DL — SIGNIFICANT CHANGE UP (ref 32–37)
MCHC RBC-ENTMCNC: 33.9 PG — HIGH (ref 27–31)
MCV RBC AUTO: 100.8 FL — HIGH (ref 81–99)
MONOCYTES # BLD AUTO: 0.25 K/UL — SIGNIFICANT CHANGE UP (ref 0.1–0.6)
MONOCYTES NFR BLD AUTO: 13 % — HIGH (ref 1.7–9.3)
NEUTROPHILS # BLD AUTO: 0.98 K/UL — LOW (ref 1.4–6.5)
NEUTROPHILS NFR BLD AUTO: 50.7 % — SIGNIFICANT CHANGE UP (ref 42.2–75.2)
NRBC # BLD: 0 /100 WBCS — SIGNIFICANT CHANGE UP (ref 0–0)
PLATELET # BLD AUTO: 105 K/UL — LOW (ref 130–400)
PMV BLD: 11.7 FL — HIGH (ref 7.4–10.4)
POTASSIUM SERPL-MCNC: 4 MMOL/L — SIGNIFICANT CHANGE UP (ref 3.5–5)
POTASSIUM SERPL-SCNC: 4 MMOL/L — SIGNIFICANT CHANGE UP (ref 3.5–5)
PROT SERPL-MCNC: 5.7 G/DL — LOW (ref 6–8)
PROTHROM AB SERPL-ACNC: 12.9 SEC — HIGH (ref 9.95–12.87)
RBC # BLD: 2.51 M/UL — LOW (ref 4.2–5.4)
RBC # FLD: 12.8 % — SIGNIFICANT CHANGE UP (ref 11.5–14.5)
SODIUM SERPL-SCNC: 136 MMOL/L — SIGNIFICANT CHANGE UP (ref 135–146)
SPECIMEN SOURCE: SIGNIFICANT CHANGE UP
SPECIMEN SOURCE: SIGNIFICANT CHANGE UP
WBC # BLD: 1.93 K/UL — LOW (ref 4.8–10.8)
WBC # FLD AUTO: 1.93 K/UL — LOW (ref 4.8–10.8)

## 2023-10-10 PROCEDURE — 99239 HOSP IP/OBS DSCHRG MGMT >30: CPT

## 2023-10-10 RX ORDER — PROPRANOLOL HCL 160 MG
0 CAPSULE, EXTENDED RELEASE 24HR ORAL
Refills: 0 | DISCHARGE

## 2023-10-10 RX ORDER — DIPHENHYDRAMINE HYDROCHLORIDE AND LIDOCAINE HYDROCHLORIDE AND ALUMINUM HYDROXIDE AND MAGNESIUM HYDRO
1 KIT
Qty: 1 | Refills: 2
Start: 2023-10-10 | End: 2024-01-07

## 2023-10-10 RX ORDER — VALACYCLOVIR 500 MG/1
1 TABLET, FILM COATED ORAL
Qty: 21 | Refills: 0
Start: 2023-10-10 | End: 2023-10-16

## 2023-10-10 RX ORDER — OXYCODONE AND ACETAMINOPHEN 5; 325 MG/1; MG/1
1 TABLET ORAL
Qty: 28 | Refills: 0
Start: 2023-10-10 | End: 2023-10-16

## 2023-10-10 RX ORDER — PROPRANOLOL HCL 160 MG
1 CAPSULE, EXTENDED RELEASE 24HR ORAL
Qty: 0 | Refills: 0 | DISCHARGE

## 2023-10-10 RX ADMIN — DIPHENHYDRAMINE HYDROCHLORIDE AND LIDOCAINE HYDROCHLORIDE AND ALUMINUM HYDROXIDE AND MAGNESIUM HYDRO 10 MILLILITER(S): KIT at 05:39

## 2023-10-10 RX ADMIN — MEROPENEM 100 MILLIGRAM(S): 1 INJECTION INTRAVENOUS at 05:41

## 2023-10-10 RX ADMIN — CHLORHEXIDINE GLUCONATE 1 APPLICATION(S): 213 SOLUTION TOPICAL at 05:40

## 2023-10-10 NOTE — DISCHARGE NOTE PROVIDER - PROVIDER TOKENS
PROVIDER:[TOKEN:[[id number]],FOLLOWUP:[2 weeks]] PROVIDER:[TOKEN:[[id number]],FOLLOWUP:[2 weeks]],PROVIDER:[TOKEN:[[id number]],FOLLOWUP:[1 week]]

## 2023-10-10 NOTE — DISCHARGE NOTE PROVIDER - NSDCCPCAREPLAN_GEN_ALL_CORE_FT
PRINCIPAL DISCHARGE DIAGNOSIS  Diagnosis: Neutropenic fever  Assessment and Plan of Treatment: You presented with a low neutrophil count and fevers in setting of chemotherapy for breast cancer treatment. You were treated with antibiotics, your x-ray and CT abdomen and pelvis did not indicate signs of acute infection. Your blood and urine cultures were negative. Your blood count continued to improve and trend up. No more antibiotics needed. You developed a rash on the forehead that appeared concerning of shingles so we sent Valtrex medication every 8 hours for 7 days to the pharmacy. We also sent first wash mouth wash. Please continue your home medications. Please follow up outpatient with oncology and pcp.      SECONDARY DISCHARGE DIAGNOSES  Diagnosis: Breast cancer  Assessment and Plan of Treatment:      PRINCIPAL DISCHARGE DIAGNOSIS  Diagnosis: Neutropenic fever  Assessment and Plan of Treatment: You presented with a low neutrophil count and fevers in setting of chemotherapy for breast cancer treatment. You were treated with antibiotics, your x-ray and CT abdomen and pelvis did not indicate signs of acute infection. Your blood and urine cultures were negative. Your blood count continued to improve and trend up. No more antibiotics needed. You developed a rash on the forehead that appeared concerning of shingles so we sent Valtrex medication every 8 hours for 7 days to the pharmacy. We also sent first wash mouth wash. Please continue your home medications. Please follow up outpatient with oncology and pcp.      SECONDARY DISCHARGE DIAGNOSES  Diagnosis: Breast cancer  Assessment and Plan of Treatment:     Diagnosis: Neutropenia associated with mucositis  Assessment and Plan of Treatment: Use Percocet for pain as needed prior to eating with Magic Mouthwash.    Diagnosis: Shingles rash  Assessment and Plan of Treatment: You have a rash on your scalp that is concerning for shingles. We will treat it as such since you are immunocompromised. Please complete the course of antivirals.

## 2023-10-10 NOTE — DISCHARGE NOTE PROVIDER - ATTENDING DISCHARGE PHYSICAL EXAMINATION:
Patient seen and examined at bedside. No acut events overnight. No fevers since 10/7. Tolerating diet. No vomiting or nausea now in few days. No more diarrhea. Blood counts much improved. She does have this rash on scalp that she says has been there for 3 days, I haven't seen it this pronounced before, showed pic to ID Dr. Saucedo, will start on Valtrex 1g q8h for 7 days. Patient stable for d/c home. Updated  over phone.    PHYSICAL EXAM:  GENERAL: NAD, lying in bed comfortably  HEAD:  Atraumatic, Normocephalic; erythematous rash on right frontal scalp, non-tender, no abscess   EYES: EOMI, PERRLA, conjunctiva and sclera clear  ENT: +mucositis improved; no thrush   NECK: Supple, No JVD  CHEST/LUNG: Clear to auscultation bilaterally; No rales, rhonchi, wheezing, or rubs. Unlabored respirations; Medoport right SVC area no pain/erythema   HEART: Regular rate and rhythm; No murmurs, rubs, or gallops  ABDOMEN: Bowel sounds present; Soft, Nontender, Nondistended. No hepatomegaly  EXTREMITIES:  2+ Peripheral Pulses, brisk capillary refill. No clubbing, cyanosis, or edema  NERVOUS SYSTEM:  Alert & Oriented X3, speech clear. No deficits   MSK: FROM all 4 extremities, full and equal strength  SKIN: No rashes or lesions    Consultant(s) Notes Reviewed:  [x ] YES  [ ] NO  Care Discussed with Consultants/Other Providers [ x] YES  [ ] NO    Vital Signs Last 24 Hrs  T(C): 37.5 (09 Oct 2023 20:45), Max: 37.5 (09 Oct 2023 20:45)  T(F): 99.5 (09 Oct 2023 20:45), Max: 99.5 (09 Oct 2023 20:45)  HR: 96 (09 Oct 2023 20:45) (96 - 96)  BP: 100/57 (09 Oct 2023 20:45) (100/57 - 100/57)  BP(mean): --  RR: 18 (09 Oct 2023 20:45) (18 - 18)  SpO2: --

## 2023-10-10 NOTE — DISCHARGE NOTE NURSING/CASE MANAGEMENT/SOCIAL WORK - NSDCPEFALRISK_GEN_ALL_CORE
For information on Fall & Injury Prevention, visit: [email or web address] OR  [email or web address] OR  https://www.cdc.gov/steadi/patient.html

## 2023-10-10 NOTE — PROGRESS NOTE ADULT - ASSESSMENT
d/c ABx  Please do not hesitate to recall ID if any questions arise either through my [employer] [facility] or through [employer] teams  49 y/o F with PMH of migraine, stage III breast CA (following with Dr. Adams at [facility]) on chemotherapy ( last session on 9/27). She presented on 10/04 for generalized body aches and fatigue that started since last chemo session, with a fever (103.7) and chills that started on 10/04. Pt reports nausea and vomiting 2/2 chemo (last vomited 3 days prior and has not used antiemetic since).     Denies chest pain, abd pain, cough, SOB, dysuria, diarrhea, rash, back pain, headache, neck pain.  Per Patient, her 13 y/o son had a fever last Friday    IMPRESSION/RECOMMENDATIONS   Patient states she feels better today  WBC 1.9 ( 50% PMNs )  clinically asymptomatic  10/4,7 BCx NG  RVP/ COVID-19 NG  UCx NG

## 2023-10-10 NOTE — DISCHARGE NOTE NURSING/CASE MANAGEMENT/SOCIAL WORK - PATIENT PORTAL LINK FT
You can access the FollowMyHealth Patient Portal offered by [facility] by registering at the following website: [email or web address]. By joining [employer]’s FollowMyHealth portal, you will also be able to view your health information using other applications (apps) compatible with our system.

## 2023-10-10 NOTE — PROGRESS NOTE ADULT - TIME BILLING
Total time spent to complete patient's bedside assessment, physical examination, review medical chart including labs & imaging, discuss medical plan of care with housestaff was more than 50 minutes.
Counseled patient about diagnostic testing and treatment plan. All questions answered. Abnormal lab/radiographical/microbiological data reviewed.

## 2023-10-10 NOTE — DISCHARGE NOTE PROVIDER - HOSPITAL COURSE
10/4 admitted     47 y/o F with PMH migraine, stage III breast CA (following with Dr. Adams at [facility]) chemo, finished last session on 9/27, hx hysterectomy presenting for body aches, chills, fatigue, runny nose since last chemo, worsened last night, with fever since this morning. Pt complained of feeling faint while waiting to be triaged that started while in waiting room. Pt reports she has constant nausea and vomiting 2/2 chemo but last vomited 3 days ago and has not used antiemetic since then. Denies chest pain, abd pain, cough, SOB, dysuria, diarrhea, back pain, headache, neck pain.    Septic on admission  febrile, tachycardia, low wbc (neutropenia)    10/5-10/10   - no fevers since 10/7   - 10/8 CTAP negative for acute infectious findings   - No obvious focus of infection  - Clinically stable  - CXR:  WNL  - was treated with cefepime 10/4-10/7   - treated w/vanc 10/4 to 10/9, dc'd as ANC improved and MRSA negative   - ANC trending up 0.570-->0.83--> 0.98 (10/10/23)   - patient is clear for dc off abx   - noted increased rash on forehead that began on admission and has grown 2-3cm in size, no drainage noted but concerns of shingles in neutropenic patient per ID eval   - will send 1g Valtrex q8 for 7 days   - send mouth wash to pharmacy to help w/PO intake   - Pain medication sent to pharmacy   - f/u w/PCP and onc outpatient         10/4 admitted     49 y/o F with PMH migraine, stage III breast CA (following with Dr. Adams at [facility]) chemo, finished last session on 9/27, hx hysterectomy presenting for body aches, chills, fatigue, runny nose since last chemo, worsened last night, with fever since this morning. Pt complained of feeling faint while waiting to be triaged that started while in waiting room. Pt reports she has constant nausea and vomiting 2/2 chemo but last vomited 3 days ago and has not used antiemetic since then. Denies chest pain, abd pain, cough, SOB, dysuria, diarrhea, back pain, headache, neck pain.    Septic on admission  febrile, tachycardia, low wbc (neutropenia)    10/5-10/10   - no fevers since 10/7   - 10/8 CTAP negative for acute infectious findings   - No obvious focus of infection  - Clinically stable  - CXR:  WNL  - was treated with cefepime 10/4-10/7   - treated w/vanc 10/4 to 10/9, dc'd as ANC improved and MRSA negative   - ANC trending up 0.570-->0.83--> 0.98 (10/10/23)   - patient is clear for dc off abx   - noted increased rash on forehead that began on admission and has grown 2-3cm in size, no drainage noted but concerns of shingles in neutropenic patient per ID eval   - will send 1g Valtrex q8 for 7 days   - send mouth wash to pharmacy to help w/PO intake   - Pain medication sent to pharmacy   - f/u w/PCP and onc outpatient

## 2023-10-10 NOTE — DISCHARGE NOTE PROVIDER - CARE PROVIDER_API CALL
LUIS GA  [address] [address]  Phone: [phone]  Fax: [phone]  Follow Up Time: 2 weeks   LUIS GA  [address] [address]  Phone: [phone]  Fax: [phone]  Follow Up Time: 2 weeks    ARACELIS WEBER MD, THEODORA DANIEL  Phone: [phone]  Fax: ()-  Follow Up Time: 1 week

## 2023-10-10 NOTE — PROGRESS NOTE ADULT - SUBJECTIVE AND OBJECTIVE BOX
TAL MANCILLA  48y, Female    All available historical data reviewed    OVERNIGHT EVENTS:  feels well and has no new complaints  No fevers     ROS:  General: Denies rigors, nightsweats  HEENT: Denies headache, rhinorrhea, sore throat, eye pain  CV: Denies CP, palpitations  PULM: Denies wheezing, hemoptysis  GI: Denies hematemesis, hematochezia, melena  : Denies discharge, hematuria  MSK: Denies arthralgias, myalgias  SKIN: Denies rash, lesions  NEURO: Denies paresthesias, weakness  PSYCH: Denies depression, anxiety    VITALS:  T(F): 99.5, Max: 99.5 (10-09-23 @ 20:45)  HR: 96  BP: 100/57  RR: 18Vital Signs Last 24 Hrs  T(C): 37.5 (09 Oct 2023 20:45), Max: 37.5 (09 Oct 2023 20:45)  T(F): 99.5 (09 Oct 2023 20:45), Max: 99.5 (09 Oct 2023 20:45)  HR: 96 (09 Oct 2023 20:45) (96 - 96)  BP: 100/57 (09 Oct 2023 20:45) (100/57 - 100/57)  BP(mean): --  RR: 18 (09 Oct 2023 20:45) (18 - 18)  SpO2: --        TESTS & MEASUREMENTS:                        8.5    1.93  )-----------( 105      ( 10 Oct 2023 08:00 )             25.3     10-10    136  |  100  |  <3<L>  ----------------------------<  94  4.0   |  25  |  <0.5<L>    Ca    8.6      10 Oct 2023 08:00  Mg     1.8     10-10    TPro  5.7<L>  /  Alb  3.5  /  TBili  0.3  /  DBili  x   /  AST  24  /  ALT  15  /  AlkPhos  50  10-10    LIVER FUNCTIONS - ( 10 Oct 2023 08:00 )  Alb: 3.5 g/dL / Pro: 5.7 g/dL / ALK PHOS: 50 U/L / ALT: 15 U/L / AST: 24 U/L / GGT: x             Culture - Blood (collected 10-07-23 @ 12:32)  Source: .Blood None  Preliminary Report (10-09-23 @ 19:01):    No growth at 48 Hours    Culture - Urine (collected 10-04-23 @ 20:48)  Source: Clean Catch Clean Catch (Midstream)  Final Report (10-06-23 @ 14:27):    <10,000 CFU/mL Normal Urogenital Lyla    Culture - Blood (collected 10-04-23 @ 19:23)  Source: .Blood Blood-Peripheral  Final Report (10-10-23 @ 02:00):    No growth at 5 days    Culture - Blood (collected 10-04-23 @ 19:23)  Source: .Blood Blood-Peripheral  Final Report (10-10-23 @ 02:00):    No growth at 5 days      Urinalysis Basic - ( 10 Oct 2023 08:00 )    Color: x / Appearance: x / SG: x / pH: x  Gluc: 94 mg/dL / Ketone: x  / Bili: x / Urobili: x   Blood: x / Protein: x / Nitrite: x   Leuk Esterase: x / RBC: x / WBC x   Sq Epi: x / Non Sq Epi: x / Bacteria: x          RADIOLOGY & ADDITIONAL TESTS:  Personal review of radiological diagnostics performed  Echo and EKG results noted when applicable.     MEDICATIONS:  acetaminophen     Tablet .. 650 milliGRAM(s) Oral every 6 hours PRN  bismuth subsalicylate Liquid 30 milliLiter(s) Oral every 8 hours PRN  chlorhexidine 2% Cloths 1 Application(s) Topical <User Schedule>  FIRST- Mouthwash  BLM 10 milliLiter(s) Swish and Spit every 6 hours  lactated ringers. 1000 milliLiter(s) IV Continuous <Continuous>  morphine  - Injectable 1 milliGRAM(s) IV Push every 6 hours PRN  propranolol 60 milliGRAM(s) Oral daily  trimethobenzamide Injectable 200 milliGRAM(s) IntraMuscular every 12 hours PRN      ANTIBIOTICS:

## 2023-10-10 NOTE — DISCHARGE NOTE PROVIDER - NSDCMRMEDTOKEN_GEN_ALL_CORE_FT
diphenhydramine/lidocaine/aluminum hydroxide/magnesium hydroxide/simethicone mucous membrane suspension: 1 application mucous membrane 3 times a day (with meals)  propranolol 60 mg oral tablet: 1 tab(s) orally once a day  Valtrex 1 g oral tablet: 1 tab(s) orally every 8 hours   diphenhydramine/lidocaine/aluminum hydroxide/magnesium hydroxide/simethicone mucous membrane suspension: 1 application mucous membrane 3 times a day (with meals)  Percocet 5 mg-325 mg oral tablet: 1 tab(s) orally every 6 hours as needed for  moderate pain prior to eating MDD: 4 tabs  propranolol 60 mg oral tablet: 1 tab(s) orally once a day  Valtrex 1 g oral tablet: 1 tab(s) orally every 8 hours

## 2023-10-12 LAB
CULTURE RESULTS: SIGNIFICANT CHANGE UP
SPECIMEN SOURCE: SIGNIFICANT CHANGE UP

## 2023-10-19 NOTE — CDI QUERY NOTE - NSCDIOTHERTXTBX_GEN_ALL_CORE_HH
Query:                                                                                   Based on your clinical judgment and consideration of the below clinical indicators, please clarify if  •  •	  •	Other (please specify).  •	Unable to determine.                                          ===========================================  Clinical indicators:  Documentation:  ** 10/5 Consult Critical care:       HPI: ... PMH migraine, stage III breast CA (following with Dr. Adams at [facility]) chemo, finished last session on 9/27, hx hysterectomy presenting for body aches, chills, fatigue, runny nose since last chemo, worsened last night, with fever since this morning. Pt complained of feeling faint while waiting to be triaged that started while in waiting room. Pt reports she has constant nausea and vomiting 2/2 chemo but last vomited 3 days ago and has not used antiemetic since then.       IMPRESSION:         Sepsis POA          neutropenic sepsis     ** 10/6 PN Hospitalist:        Sepsis unclear focus associated with neutropenia. improving. still with abn tp. ID is following  thrombocytopenia due to infection. scd in lieu of anticoag. close follow up. consider dic panel if it get worse. heme following     ** 10/9 PN Hospitalist:        Assessment:           #Cryptogenic SIRS with absolute neutropenia         #Neutropenic fever         #Stage III breast cancer          -no fevers since 10/7          -No obvious focus of infection    **10/10 Discharge Note:      Septic on admission      febrile, tachycardia, low wbc (neutropenia)      10/5-10/10        - no fevers since 10/7        - 10/8 CTAP negative for acute infectious findings        - No obvious focus of infection       - Clinically stable       - CXR:  WNL       - was treated with cefepime 10/4-10/7     Vital:    Lab:    Orders: Query:                                                                                   Based on your clinical judgment and consideration of the below clinical indicators, please clarify if sepsis can be further specif  •  •	  •	Other (please specify).  •	Unable to determine.                                          ===========================================  Clinical indicators:  Documentation:  ** 10/5 Consult Critical care:       HPI: ... PMH migraine, stage III breast CA (following with Dr. Adams at [facility]) chemo, finished last session on 9/27, hx hysterectomy presenting for body aches, chills, fatigue, runny nose since last chemo, worsened last night, with fever since this morning. Pt complained of feeling faint while waiting to be triaged that started while in waiting room. Pt reports she has constant nausea and vomiting 2/2 chemo but last vomited 3 days ago and has not used antiemetic since then.       IMPRESSION:         Sepsis POA          neutropenic sepsis     ** 10/6 PN Hospitalist:        Sepsis unclear focus associated with neutropenia. improving. still with abn tp. ID is following  thrombocytopenia due to infection. scd in lieu of anticoag. close follow up. consider dic panel if it get worse. heme following     ** 10/9 PN Hospitalist:        Assessment:           #Cryptogenic SIRS with absolute neutropenia         #Neutropenic fever         #Stage III breast cancer          -no fevers since 10/7          -No obvious focus of infection    **10/10 Discharge Note:      Septic on admission      febrile, tachycardia, low wbc (neutropenia)      10/5-10/10        - no fevers since 10/7        - 10/8 CTAP negative for acute infectious findings        - No obvious focus of infection       - Clinically stable       - CXR:  WNL       - was treated with cefepime 10/4-10/7     Vital:  ED" T 102.6,     Lab:  WBC: 1.1 (10/4)    Orders:  ** 10/4-8: Vancomycin 1000 mg IV/12 Hrs. Indication: Empiric.  ** 10/8-9: Vancomycin 1250 mg IV/12 Hrs. Indication: Empiric  ** 10/7-10: Meropenem 1000 mg IV/8Hrs. Indication: Febrile neutropenia Query:                                                                                   Based on your clinical judgment and consideration of the below clinical indicators, please clarify if sepsis can be further specify as:  • Sepsis was evaluated and ruled out. No focus of infection. Patient has Cryptogenic SIRS with absolute neutropenia  • Other (please specify).  • Unable to determine.                                          ===========================================  Clinical indicators:  Documentation:  ** 10/5 Consult Critical care:       HPI: ... PMH migraine, stage III breast CA (following with Dr. Adams at [facility]) chemo, finished last session on 9/27, hx hysterectomy presenting for body aches, chills, fatigue, runny nose since last chemo, worsened last night, with fever since this morning. Pt complained of feeling faint while waiting to be triaged that started while in waiting room. Pt reports she has constant nausea and vomiting 2/2 chemo but last vomited 3 days ago and has not used antiemetic since then.       IMPRESSION:         Sepsis POA          neutropenic sepsis     ** 10/6 PN Hospitalist:        Sepsis unclear focus associated with neutropenia. improving. still with abn tp. ID is following  thrombocytopenia due to infection. scd in lieu of anticoag. close follow up. consider dic panel if it get worse. heme following     ** 10/9 PN Hospitalist:        Assessment:           #Cryptogenic SIRS with absolute neutropenia         #Neutropenic fever         #Stage III breast cancer          -no fevers since 10/7          -No obvious focus of infection    **10/10 Discharge Note:      Septic on admission      febrile, tachycardia, low wbc (neutropenia)      10/5-10/10        - no fevers since 10/7        - 10/8 CTAP negative for acute infectious findings        - No obvious focus of infection       - Clinically stable       - CXR:  WNL       - was treated with cefepime 10/4-10/7     Vital:  ED" T 102.6,     Lab:  WBC: 1.1 (10/4)    Orders:  ** 10/4-8: Vancomycin 1000 mg IV/12 Hrs. Indication: Empiric.  ** 10/8-9: Vancomycin 1250 mg IV/12 Hrs. Indication: Empiric  ** 10/7-10: Meropenem 1000 mg IV/8Hrs. Indication: Febrile neutropenia

## 2023-10-19 NOTE — CDI QUERY NOTE - NSCDIOTHERTXTBX2_GEN_ALL_CORE_FT
Query:                                                                                   Based on your clinical judgment and consideration of the below clinical indicators, please clarify if neutropenia, anemia and thrombocytopenia can be further   • Neutropenia, anemia and thrombocytopenia associated with pancytopenia secondary to chemotherapy was evaluated and could not be excluded at the time of discharge  • Neutropenia, anemia and thrombocytopenia are not associated with pancytopenia secondary to chemotherapy	  • Other (please specify).  • Unable to determine.                                          ===========================================  Clinical Indicators:  Documentation:  ** 10/7 PN Internal Medicine:       Assessment:         1, Neutropenic fever   . hx of Breast Ca stage 3, on chemotherapy ([facility]             * last chemo on 9/27/2023, febrile since 10/4, tachy, wbc 1100, ANC 20         2. Macrocytic anemia likely secondary to chemotherapy            - Vit b12: nl                  - Folate:nl         4. Thrombocytopenia might due to chemo           *  Need records to compare. pt reported her thrombocytopenia is usually around 90K    ** 10/9 PN Hospitalist:        Assessment:           #Neutropenic fever          #Macrocytic anemia and thrombocytopenia- likely from chemo/possible viral infection? patient's son recently with febrile illness           Need records to compare. pt reported her thrombocytopenia is usually around 90K    Lab:  WBC count: 1.1 > 0.62 > .87 > ..... > 1.64 > 1.93  RBC count: 2.93 > 2.38 > 2.27 > .... 2.33 > 2.51  Platelet count: 37 > 31 > 22 > ........ > 75 > 105

## 2023-10-23 DIAGNOSIS — D70.9 NEUTROPENIA, UNSPECIFIED: ICD-10-CM

## 2023-10-23 DIAGNOSIS — G43.909 MIGRAINE, UNSPECIFIED, NOT INTRACTABLE, WITHOUT STATUS MIGRAINOSUS: ICD-10-CM

## 2023-10-23 DIAGNOSIS — J98.11 ATELECTASIS: ICD-10-CM

## 2023-10-23 DIAGNOSIS — R19.7 DIARRHEA, UNSPECIFIED: ICD-10-CM

## 2023-10-23 DIAGNOSIS — I10 ESSENTIAL (PRIMARY) HYPERTENSION: ICD-10-CM

## 2023-10-23 DIAGNOSIS — K12.30 ORAL MUCOSITIS (ULCERATIVE), UNSPECIFIED: ICD-10-CM

## 2023-10-23 DIAGNOSIS — B02.9 ZOSTER WITHOUT COMPLICATIONS: ICD-10-CM

## 2023-10-23 DIAGNOSIS — R91.1 SOLITARY PULMONARY NODULE: ICD-10-CM

## 2023-10-23 DIAGNOSIS — E83.42 HYPOMAGNESEMIA: ICD-10-CM

## 2023-10-23 DIAGNOSIS — K76.0 FATTY (CHANGE OF) LIVER, NOT ELSEWHERE CLASSIFIED: ICD-10-CM

## 2023-10-23 DIAGNOSIS — D69.59 OTHER SECONDARY THROMBOCYTOPENIA: ICD-10-CM

## 2023-10-23 DIAGNOSIS — D64.81 ANEMIA DUE TO ANTINEOPLASTIC CHEMOTHERAPY: ICD-10-CM

## 2023-10-23 DIAGNOSIS — C50.919 MALIGNANT NEOPLASM OF UNSPECIFIED SITE OF UNSPECIFIED FEMALE BREAST: ICD-10-CM

## 2023-10-23 DIAGNOSIS — D61.810 ANTINEOPLASTIC CHEMOTHERAPY INDUCED PANCYTOPENIA: ICD-10-CM

## 2024-04-22 NOTE — ED PROVIDER NOTE - NS HIV RISK FACTOR YES
Your current Orthopaedic problem we are working together to treat is:  Cervical Spine.    CONSULT  We recommend you schedule a consultation with pain management and acupuncture for a further in-depth understanding and treatment of your problem.  [facility] [phone]    It is recommended you schedule a follow-up appointment with Wilbert Posey MD  as needed.      If it is urgent that you speak with someone outside of these hours, our [facility] Call Center will be able to assist you. You can reach the office by calling the:    Call [phone] to schedule an appointment with Dr Posey and/or you have any questions or concerns.      Thank you for choosing [facility] as your Orthopaedic provider!   
Declined

## 2025-07-09 ENCOUNTER — NON-APPOINTMENT (OUTPATIENT)
Age: 50
End: 2025-07-09

## 2025-07-09 ENCOUNTER — APPOINTMENT (OUTPATIENT)
Age: 50
End: 2025-07-09
Payer: COMMERCIAL

## 2025-07-09 VITALS
DIASTOLIC BLOOD PRESSURE: 84 MMHG | WEIGHT: 157 LBS | HEIGHT: 60.2 IN | BODY MASS INDEX: 30.42 KG/M2 | SYSTOLIC BLOOD PRESSURE: 139 MMHG | HEART RATE: 85 BPM

## 2025-07-09 PROCEDURE — 99386 PREV VISIT NEW AGE 40-64: CPT

## 2025-07-09 RX ORDER — VENLAFAXINE 75 MG/1
75 TABLET ORAL
Refills: 0 | Status: ACTIVE | COMMUNITY

## 2025-07-09 RX ORDER — PROPRANOLOL HYDROCHLORIDE 60 MG/1
60 TABLET ORAL
Refills: 0 | Status: ACTIVE | COMMUNITY

## 2025-07-09 RX ORDER — HYDROCORTISONE 10 MG/1
10 TABLET ORAL
Refills: 0 | Status: ACTIVE | COMMUNITY